# Patient Record
Sex: FEMALE | Race: WHITE | NOT HISPANIC OR LATINO
[De-identification: names, ages, dates, MRNs, and addresses within clinical notes are randomized per-mention and may not be internally consistent; named-entity substitution may affect disease eponyms.]

---

## 2018-04-24 ENCOUNTER — RESULT REVIEW (OUTPATIENT)
Age: 53
End: 2018-04-24

## 2019-02-21 PROBLEM — Z00.00 ENCOUNTER FOR PREVENTIVE HEALTH EXAMINATION: Status: ACTIVE | Noted: 2019-02-21

## 2019-02-25 ENCOUNTER — RECORD ABSTRACTING (OUTPATIENT)
Age: 54
End: 2019-02-25

## 2019-02-25 DIAGNOSIS — R23.2 FLUSHING: ICD-10-CM

## 2019-02-25 DIAGNOSIS — Z78.9 OTHER SPECIFIED HEALTH STATUS: ICD-10-CM

## 2019-02-25 DIAGNOSIS — Z87.59 PERSONAL HISTORY OF OTHER COMPLICATIONS OF PREGNANCY, CHILDBIRTH AND THE PUERPERIUM: ICD-10-CM

## 2019-02-25 DIAGNOSIS — N39.3 STRESS INCONTINENCE (FEMALE) (MALE): ICD-10-CM

## 2019-02-25 LAB — CYTOLOGY CVX/VAG DOC THIN PREP: NORMAL

## 2019-04-25 ENCOUNTER — APPOINTMENT (OUTPATIENT)
Dept: OBGYN | Facility: CLINIC | Age: 54
End: 2019-04-25
Payer: COMMERCIAL

## 2019-04-25 ENCOUNTER — RESULT CHARGE (OUTPATIENT)
Age: 54
End: 2019-04-25

## 2019-04-25 VITALS
HEIGHT: 64 IN | SYSTOLIC BLOOD PRESSURE: 110 MMHG | WEIGHT: 129 LBS | DIASTOLIC BLOOD PRESSURE: 80 MMHG | BODY MASS INDEX: 22.02 KG/M2

## 2019-04-25 DIAGNOSIS — Z12.4 ENCOUNTER FOR SCREENING FOR MALIGNANT NEOPLASM OF CERVIX: ICD-10-CM

## 2019-04-25 DIAGNOSIS — Z12.31 ENCOUNTER FOR SCREENING MAMMOGRAM FOR MALIGNANT NEOPLASM OF BREAST: ICD-10-CM

## 2019-04-25 DIAGNOSIS — R92.2 INCONCLUSIVE MAMMOGRAM: ICD-10-CM

## 2019-04-25 DIAGNOSIS — N89.8 OTHER SPECIFIED NONINFLAMMATORY DISORDERS OF VAGINA: ICD-10-CM

## 2019-04-25 LAB
BILIRUB UR QL STRIP: NORMAL
GLUCOSE UR-MCNC: NORMAL
HCG UR QL: 0.2 EU/DL
HGB UR QL STRIP.AUTO: NORMAL
KETONES UR-MCNC: NORMAL
LEUKOCYTE ESTERASE UR QL STRIP: NORMAL
NITRITE UR QL STRIP: NORMAL
PH UR STRIP: 6.5
PROT UR STRIP-MCNC: NORMAL
SP GR UR STRIP: 1

## 2019-04-25 PROCEDURE — 81003 URINALYSIS AUTO W/O SCOPE: CPT | Mod: QW

## 2019-04-25 PROCEDURE — 99396 PREV VISIT EST AGE 40-64: CPT

## 2019-04-25 RX ORDER — LEVOTHYROXINE SODIUM 75 UG/1
75 TABLET ORAL
Refills: 0 | Status: DISCONTINUED | COMMUNITY
End: 2019-04-25

## 2019-04-25 RX ORDER — LEVOTHYROXINE SODIUM 88 UG/1
88 TABLET ORAL
Qty: 90 | Refills: 0 | Status: DISCONTINUED | COMMUNITY
Start: 2019-02-21

## 2019-04-25 RX ORDER — MECLIZINE HYDROCHLORIDE 25 MG/1
25 TABLET ORAL
Qty: 30 | Refills: 0 | Status: DISCONTINUED | COMMUNITY
Start: 2018-11-28

## 2019-04-25 RX ORDER — METHYLPREDNISOLONE 4 MG/1
4 TABLET ORAL
Qty: 21 | Refills: 0 | Status: DISCONTINUED | COMMUNITY
Start: 2018-11-28

## 2019-04-25 NOTE — PHYSICAL EXAM
[Awake] : awake [Alert] : alert [Soft] : soft [Labia Majora] : labia major [Labia Minora] : labia minora [Uterine Adnexae] : were not tender and not enlarged [Normal] : clitoris [Acute Distress] : no acute distress [LAD] : no lymphadenopathy [Mass] : no breast mass [Thyroid Nodule] : no thyroid nodule [Goiter] : no goiter [Nipple Discharge] : no nipple discharge [Axillary LAD] : no axillary lymphadenopathy [Tender] : non tender

## 2019-04-25 NOTE — COUNSELING
[Breast Self Exam] : breast self exam [Exercise] : exercise [Other ___] : [unfilled] [Vitamins/Supplements] : vitamins/supplements

## 2019-04-25 NOTE — HISTORY OF PRESENT ILLNESS
[Definite:  ___ (Date)] : the last menstrual period was [unfilled] [Menarche Age: ____] : age at menarche was [unfilled] [Sexually Active] : is sexually active [Monogamous] : is monogamous [Male ___] : [unfilled] male [Contraception] : does not use contraception

## 2019-04-28 LAB — HPV HIGH+LOW RISK DNA PNL CVX: NOT DETECTED

## 2019-04-29 LAB — CYTOLOGY CVX/VAG DOC THIN PREP: NORMAL

## 2020-03-04 ENCOUNTER — APPOINTMENT (OUTPATIENT)
Dept: OBGYN | Facility: CLINIC | Age: 55
End: 2020-03-04
Payer: COMMERCIAL

## 2020-03-04 VITALS
SYSTOLIC BLOOD PRESSURE: 98 MMHG | BODY MASS INDEX: 21.33 KG/M2 | DIASTOLIC BLOOD PRESSURE: 62 MMHG | HEIGHT: 65 IN | WEIGHT: 128 LBS

## 2020-03-04 DIAGNOSIS — N63.20 UNSPECIFIED LUMP IN THE LEFT BREAST, UNSPECIFIED QUADRANT: ICD-10-CM

## 2020-03-04 PROCEDURE — 99213 OFFICE O/P EST LOW 20 MIN: CPT

## 2020-03-04 NOTE — HISTORY OF PRESENT ILLNESS
[Last Mammogram ___] : Last Mammogram was [unfilled] [Last Pap ___] : Last cervical pap smear was [unfilled] [Perimenopausal] : is perimenopausal [Monogamous (Male Partner)] : is monogamous with a male partner [HPV Vaccine NA Due to Age] : HPV vaccine not available to patient due to age [unknown] : the patient is unsure of the date of her LMP [Menarche Age: ____] : age at menarche was [unfilled] [Sexually Active] : is sexually active [Monogamous] : is monogamous [___ Month(s) Ago] : [unfilled] month(s) ago [Good] : being in good health [Healthy Diet] : a healthy diet [Regular Exercise] : regular exercise [Pregnancy History] : pregnancy history: [Total Preg ___] : [unfilled] [Full Term ___] : [unfilled] [Living ___] : [unfilled] [AB Spont ___] : miscarriages: [unfilled] [Weight Concerns] : no concerns with her weight

## 2020-04-27 ENCOUNTER — APPOINTMENT (OUTPATIENT)
Dept: OBGYN | Facility: CLINIC | Age: 55
End: 2020-04-27

## 2020-08-24 ENCOUNTER — APPOINTMENT (OUTPATIENT)
Dept: OBGYN | Facility: CLINIC | Age: 55
End: 2020-08-24
Payer: COMMERCIAL

## 2020-08-24 VITALS
TEMPERATURE: 97.9 F | DIASTOLIC BLOOD PRESSURE: 70 MMHG | HEIGHT: 65 IN | WEIGHT: 128 LBS | SYSTOLIC BLOOD PRESSURE: 110 MMHG | BODY MASS INDEX: 21.33 KG/M2

## 2020-08-24 DIAGNOSIS — Z01.419 ENCOUNTER FOR GYNECOLOGICAL EXAMINATION (GENERAL) (ROUTINE) W/OUT ABNORMAL FINDINGS: ICD-10-CM

## 2020-08-24 DIAGNOSIS — R59.9 ENLARGED LYMPH NODES, UNSPECIFIED: ICD-10-CM

## 2020-08-24 DIAGNOSIS — R92.8 OTHER ABNORMAL AND INCONCLUSIVE FINDINGS ON DIAGNOSTIC IMAGING OF BREAST: ICD-10-CM

## 2020-08-24 PROCEDURE — 99396 PREV VISIT EST AGE 40-64: CPT

## 2020-08-25 PROBLEM — R59.9 ENLARGED LYMPH NODE: Status: ACTIVE | Noted: 2020-08-25

## 2020-08-25 NOTE — REVIEW OF SYSTEMS
[Nl] : Integumentary [Dec Hearing] : no hearing loss [Chest Pain] : no chest pain [Chills] : no chills [Abdominal Pain] : no abdominal pain [Pelvic Pain] : no pelvic pain [Breast Pain] : no breast pain

## 2020-08-25 NOTE — PHYSICAL EXAM
[Awake] : awake [Breast Palpation Diffuse Fibrous Tissue Bilateral] : fibrocystic changes [Alert] : alert [Breast Implant Bilateral] : implants [No Discharge] : no discharge [No Masses] : no breast masses were palpable [Size ___ cm] : [unfilled]Ucm(s) in diameter [Axillary Lymph Nodes Enlarged On The Left] : enlarged node on the left [Mobile] : mobile [Rubbery] : rubbery [Labia Minora] : labia minora [Labia Majora] : labia major [No Bleeding] : there was no active vaginal bleeding [Normal] : clitoris [Acute Distress] : no acute distress [Superficial Breast Veins Dilated Bilaterally] : no dilated superficial veins [Dimpling In Both Breasts] : no dimpling [Breast - Peau D'Orange Bilateral] : no Peau D'Orange [___] : no induration [Skin Erythema] : no erythema [Enlargement Of The Right Breast] : no swelling [Discharge] : had no discharge [Tender] : non-tender [Pap Obtained] : a Pap smear was not performed [FreeTextEntry6] : small mobile uterus. normal adnexa. No masses.

## 2020-08-25 NOTE — HISTORY OF PRESENT ILLNESS
[Good] : being in good health [1 Year Ago] : 1 year ago [Healthy Diet] : a healthy diet [Last Mammogram ___] : Last Mammogram was [unfilled] [Postmenopausal] : is postmenopausal [Last Pap ___] : Last cervical pap smear was [unfilled] [Menarche Age ____] : age at menarche was [unfilled] [Menstrual Problems] : reports abnormal menses [Pregnancy History] : pregnancy history: [Total Preg ___] : [unfilled] [Living ___] : [unfilled] [Full Term ___] : [unfilled] [AB Spont ___] : miscarriages: [unfilled] [HPV Vaccine NA Due to Age] : HPV vaccine not available to patient due to age [unknown] : the patient is unsure of the date of her LMP [Menarche Age: ____] : age at menarche was [unfilled] [Sexually Active] : is sexually active [Monogamous] : is monogamous [No] : no [Male ___] : [unfilled] male [Weight Concerns] : no concerns with her weight [Regular Exercise] : not exercising regularly [Contraception] : does not use contraception

## 2020-09-08 ENCOUNTER — OUTPATIENT (OUTPATIENT)
Dept: OUTPATIENT SERVICES | Facility: HOSPITAL | Age: 55
LOS: 1 days | End: 2020-09-08
Payer: COMMERCIAL

## 2020-09-08 ENCOUNTER — APPOINTMENT (OUTPATIENT)
Dept: ULTRASOUND IMAGING | Facility: CLINIC | Age: 55
End: 2020-09-08
Payer: COMMERCIAL

## 2020-09-08 ENCOUNTER — APPOINTMENT (OUTPATIENT)
Dept: MAMMOGRAPHY | Facility: CLINIC | Age: 55
End: 2020-09-08
Payer: COMMERCIAL

## 2020-09-08 DIAGNOSIS — Z00.8 ENCOUNTER FOR OTHER GENERAL EXAMINATION: ICD-10-CM

## 2020-09-08 DIAGNOSIS — R59.9 ENLARGED LYMPH NODES, UNSPECIFIED: ICD-10-CM

## 2020-09-08 PROCEDURE — 76642 ULTRASOUND BREAST LIMITED: CPT | Mod: 26,LT

## 2020-09-08 PROCEDURE — 77065 DX MAMMO INCL CAD UNI: CPT

## 2020-09-08 PROCEDURE — G0279: CPT | Mod: 26

## 2020-09-08 PROCEDURE — 77065 DX MAMMO INCL CAD UNI: CPT | Mod: 26,LT

## 2020-09-08 PROCEDURE — G0279: CPT

## 2020-09-08 PROCEDURE — 76642 ULTRASOUND BREAST LIMITED: CPT

## 2020-09-15 ENCOUNTER — RESULT REVIEW (OUTPATIENT)
Age: 55
End: 2020-09-15

## 2020-09-15 ENCOUNTER — APPOINTMENT (OUTPATIENT)
Dept: ULTRASOUND IMAGING | Facility: CLINIC | Age: 55
End: 2020-09-15
Payer: COMMERCIAL

## 2020-09-15 ENCOUNTER — APPOINTMENT (OUTPATIENT)
Dept: MAMMOGRAPHY | Facility: CLINIC | Age: 55
End: 2020-09-15
Payer: COMMERCIAL

## 2020-09-15 ENCOUNTER — OUTPATIENT (OUTPATIENT)
Dept: OUTPATIENT SERVICES | Facility: HOSPITAL | Age: 55
LOS: 1 days | End: 2020-09-15
Payer: COMMERCIAL

## 2020-09-15 ENCOUNTER — TRANSCRIPTION ENCOUNTER (OUTPATIENT)
Age: 55
End: 2020-09-15

## 2020-09-15 DIAGNOSIS — R92.1 MAMMOGRAPHIC CALCIFICATION FOUND ON DIAGNOSTIC IMAGING OF BREAST: ICD-10-CM

## 2020-09-15 PROCEDURE — 19083 BX BREAST 1ST LESION US IMAG: CPT | Mod: LT

## 2020-09-15 PROCEDURE — 88341 IMHCHEM/IMCYTCHM EA ADD ANTB: CPT | Mod: 26,59

## 2020-09-15 PROCEDURE — 19083 BX BREAST 1ST LESION US IMAG: CPT

## 2020-09-15 PROCEDURE — 88360 TUMOR IMMUNOHISTOCHEM/MANUAL: CPT | Mod: 26

## 2020-09-15 PROCEDURE — 77065 DX MAMMO INCL CAD UNI: CPT | Mod: 26,LT

## 2020-09-15 PROCEDURE — 19081 BX BREAST 1ST LESION STRTCTC: CPT

## 2020-09-15 PROCEDURE — 88305 TISSUE EXAM BY PATHOLOGIST: CPT | Mod: 26

## 2020-09-15 PROCEDURE — 88305 TISSUE EXAM BY PATHOLOGIST: CPT

## 2020-09-15 PROCEDURE — 88342 IMHCHEM/IMCYTCHM 1ST ANTB: CPT | Mod: 26,59

## 2020-09-15 PROCEDURE — 19081 BX BREAST 1ST LESION STRTCTC: CPT | Mod: LT

## 2020-09-15 PROCEDURE — 88341 IMHCHEM/IMCYTCHM EA ADD ANTB: CPT

## 2020-09-15 PROCEDURE — 88360 TUMOR IMMUNOHISTOCHEM/MANUAL: CPT

## 2020-09-15 PROCEDURE — 77065 DX MAMMO INCL CAD UNI: CPT

## 2020-09-15 PROCEDURE — A4648: CPT

## 2020-09-30 ENCOUNTER — APPOINTMENT (OUTPATIENT)
Dept: MRI IMAGING | Facility: CLINIC | Age: 55
End: 2020-09-30

## 2020-12-22 ENCOUNTER — APPOINTMENT (OUTPATIENT)
Dept: SURGERY | Facility: CLINIC | Age: 55
End: 2020-12-22
Payer: COMMERCIAL

## 2020-12-22 VITALS
TEMPERATURE: 98.4 F | WEIGHT: 129 LBS | DIASTOLIC BLOOD PRESSURE: 76 MMHG | OXYGEN SATURATION: 99 % | SYSTOLIC BLOOD PRESSURE: 114 MMHG | HEIGHT: 64 IN | HEART RATE: 88 BPM | BODY MASS INDEX: 22.02 KG/M2

## 2020-12-22 DIAGNOSIS — F41.9 ANXIETY DISORDER, UNSPECIFIED: ICD-10-CM

## 2020-12-22 DIAGNOSIS — G47.9 SLEEP DISORDER, UNSPECIFIED: ICD-10-CM

## 2020-12-22 PROCEDURE — 99205 OFFICE O/P NEW HI 60 MIN: CPT

## 2020-12-22 PROCEDURE — 99072 ADDL SUPL MATRL&STAF TM PHE: CPT

## 2020-12-23 PROBLEM — Z01.419 ENCOUNTER FOR ROUTINE GYNECOLOGICAL EXAMINATION: Status: RESOLVED | Noted: 2020-08-24 | Resolved: 2020-12-23

## 2020-12-23 PROBLEM — G47.9 DIFFICULTY SLEEPING: Status: ACTIVE | Noted: 2020-12-23

## 2020-12-23 PROBLEM — F41.9 ANXIETY: Status: ACTIVE | Noted: 2020-12-23

## 2020-12-28 NOTE — ASSESSMENT
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  Recommendation:\par 1.  Consult with plastic surgeon\par 2.  Restaging MR breast ( scheduled at [facility] on 2/4/2021)\par 3.  Neoadjuvant chemotherapy ( anticipate to complete on 1/28/2021)\par 4.  AMOL to be placed in postive node\par 5.  Follow up on 2/9/2021 after restaging MR breast

## 2020-12-28 NOTE — HISTORY OF PRESENT ILLNESS
[FreeTextEntry1] : I had the pleasure of seeing Dora Valladares in the office for a new visit breast evaluation secondary to newly diagnosed locally advanced left breast cancer. \par \par Dora is accompanied by her  Garth. She was diagnosed with left breast cancer (DCIS of the left breast ER negative MD negative) with + lymph node metastasis (ER negative MD negative [id number] positive) and is currently receiving neoadjuvant treatment at [facility] [place]. She has 3 treatments left to complete neoadjuvant and is scheduled to undergo MRI/mammogram in early Feb 2021.\par \par She initially presented to her gynecologist with a complaint of left breast palpable finding in the subareolar position for which she underwent breast imaging which demonstrated a nodule and calcifications thought to be benign. She then saw Dr. Yang (Breast surgeon) who agreed that findings were benign. She was scheduled to undergo repeat imaging in 10/2020 for follow up but prior to this she palpated what she reports as a lump in the left lower lying axillary region.  At this time she underwent repeat imaging with diagnostic mammogram/ultrasound and a biopsy which demonstrated DCIS ER  negative MD negative and a left axillary node was biopsied ER negative MD negative Ulh9gel negative. \par \par 10/5/202 Genetics: [facility] Negative\par \par 9/15/2020  Pathology\par 1.Left breast inferior axillary lymph node, ultrasound guided core biopsy:  Metastatic mammary duct carcinoma measuring 0.8 cm.  ER 0% MD 0% Her2 Positive\par 2.  Left breast inner stereotactic core biopsy:  DCIS, solid and cribriform pattern with high grade nuclear atypia with comedonecrosis.  Microcalcifications is present in DCIS.  Uninvolved breast tissue shows proliferative fibrocystic disease.  ER 0% MD 0%\par \par We reviewed pathophysiology of the tumor at length.  She is already scheduled for restaging MR breast on 2/4/2021 at [facility] [place] and will follow up after MR breast.  Consultation will be arranged with plastic surgeon to discuss breast reconstruction options.  We also discussed having a AMOL being placed in the positive node.\par \par She understands and agrees with plan.  All questions answered.\par

## 2020-12-28 NOTE — PHYSICAL EXAM
[Normocephalic] : normocephalic [Atraumatic] : atraumatic [EOMI] : extra ocular movement intact [PERRL] : pupils equal, round and reactive to light [Sclera nonicteric] : sclera nonicteric [Supple] : supple [No Supraclavicular Adenopathy] : no supraclavicular adenopathy [Examined in the supine and seated position] : examined in the supine and seated position [No dominant masses] : no dominant masses in right breast  [No dominant masses] : no dominant masses left breast [No Nipple Retraction] : no left nipple retraction [No Nipple Discharge] : no left nipple discharge [No Axillary Lymphadenopathy] : no left axillary lymphadenopathy [No Edema] : no edema [No Rashes] : no rashes [No Ulceration] : no ulceration [de-identified] : No supraclavicular or axillary adenopathy. No dominant masses, normal to palpation. Everted nipple without discharge. No skin changes.\par \par  [de-identified] : No supraclavicular or axillary adenopathy. No dominant masses, normal to palpation. Everted nipple without discharge. No skin changes.\par \par

## 2021-01-07 ENCOUNTER — RESULT REVIEW (OUTPATIENT)
Age: 56
End: 2021-01-07

## 2021-01-07 ENCOUNTER — OUTPATIENT (OUTPATIENT)
Dept: OUTPATIENT SERVICES | Facility: HOSPITAL | Age: 56
LOS: 1 days | End: 2021-01-07
Payer: COMMERCIAL

## 2021-01-07 DIAGNOSIS — N64.89 OTHER SPECIFIED DISORDERS OF BREAST: ICD-10-CM

## 2021-01-07 PROCEDURE — 19285 PERQ DEV BREAST 1ST US IMAG: CPT

## 2021-01-07 PROCEDURE — 19285 PERQ DEV BREAST 1ST US IMAG: CPT | Mod: LT

## 2021-01-07 PROCEDURE — C1739: CPT

## 2021-01-11 ENCOUNTER — NON-APPOINTMENT (OUTPATIENT)
Age: 56
End: 2021-01-11

## 2021-01-14 ENCOUNTER — APPOINTMENT (OUTPATIENT)
Dept: PLASTIC SURGERY | Facility: CLINIC | Age: 56
End: 2021-01-14

## 2021-02-01 ENCOUNTER — OUTPATIENT (OUTPATIENT)
Dept: OUTPATIENT SERVICES | Facility: HOSPITAL | Age: 56
LOS: 1 days | End: 2021-02-01
Payer: COMMERCIAL

## 2021-02-01 ENCOUNTER — RESULT REVIEW (OUTPATIENT)
Age: 56
End: 2021-02-01

## 2021-02-01 DIAGNOSIS — C50.912 MALIGNANT NEOPLASM OF UNSPECIFIED SITE OF LEFT FEMALE BREAST: ICD-10-CM

## 2021-02-01 PROCEDURE — 88321 CONSLTJ&REPRT SLD PREP ELSWR: CPT

## 2021-02-02 DIAGNOSIS — C50.912 MALIGNANT NEOPLASM OF UNSPECIFIED SITE OF LEFT FEMALE BREAST: ICD-10-CM

## 2021-02-17 ENCOUNTER — APPOINTMENT (OUTPATIENT)
Dept: ULTRASOUND IMAGING | Facility: CLINIC | Age: 56
End: 2021-02-17
Payer: COMMERCIAL

## 2021-02-22 ENCOUNTER — RESULT REVIEW (OUTPATIENT)
Age: 56
End: 2021-02-22

## 2021-02-22 ENCOUNTER — OUTPATIENT (OUTPATIENT)
Dept: OUTPATIENT SERVICES | Facility: HOSPITAL | Age: 56
LOS: 1 days | End: 2021-02-22
Payer: COMMERCIAL

## 2021-02-22 VITALS
TEMPERATURE: 98 F | SYSTOLIC BLOOD PRESSURE: 108 MMHG | DIASTOLIC BLOOD PRESSURE: 76 MMHG | WEIGHT: 126.99 LBS | HEART RATE: 68 BPM | HEIGHT: 64 IN | OXYGEN SATURATION: 98 % | RESPIRATION RATE: 18 BRPM

## 2021-02-22 DIAGNOSIS — Z01.818 ENCOUNTER FOR OTHER PREPROCEDURAL EXAMINATION: ICD-10-CM

## 2021-02-22 DIAGNOSIS — C50.912 MALIGNANT NEOPLASM OF UNSPECIFIED SITE OF LEFT FEMALE BREAST: ICD-10-CM

## 2021-02-22 DIAGNOSIS — Z98.891 HISTORY OF UTERINE SCAR FROM PREVIOUS SURGERY: Chronic | ICD-10-CM

## 2021-02-22 DIAGNOSIS — Z98.82 BREAST IMPLANT STATUS: Chronic | ICD-10-CM

## 2021-02-22 DIAGNOSIS — Z98.890 OTHER SPECIFIED POSTPROCEDURAL STATES: Chronic | ICD-10-CM

## 2021-02-22 LAB
ANION GAP SERPL CALC-SCNC: 4 MMOL/L — LOW (ref 5–17)
APPEARANCE UR: CLEAR — SIGNIFICANT CHANGE UP
APTT BLD: 30.1 SEC — SIGNIFICANT CHANGE UP (ref 27.5–35.5)
BASOPHILS # BLD AUTO: 0.03 K/UL — SIGNIFICANT CHANGE UP (ref 0–0.2)
BASOPHILS NFR BLD AUTO: 0.7 % — SIGNIFICANT CHANGE UP (ref 0–2)
BILIRUB UR-MCNC: NEGATIVE — SIGNIFICANT CHANGE UP
BUN SERPL-MCNC: 13 MG/DL — SIGNIFICANT CHANGE UP (ref 7–23)
CALCIUM SERPL-MCNC: 8.9 MG/DL — SIGNIFICANT CHANGE UP (ref 8.5–10.1)
CHLORIDE SERPL-SCNC: 107 MMOL/L — SIGNIFICANT CHANGE UP (ref 96–108)
CO2 SERPL-SCNC: 29 MMOL/L — SIGNIFICANT CHANGE UP (ref 22–31)
COLOR SPEC: YELLOW — SIGNIFICANT CHANGE UP
CREAT SERPL-MCNC: 0.71 MG/DL — SIGNIFICANT CHANGE UP (ref 0.5–1.3)
DIFF PNL FLD: NEGATIVE — SIGNIFICANT CHANGE UP
EOSINOPHIL # BLD AUTO: 0.09 K/UL — SIGNIFICANT CHANGE UP (ref 0–0.5)
EOSINOPHIL NFR BLD AUTO: 2 % — SIGNIFICANT CHANGE UP (ref 0–6)
GLUCOSE SERPL-MCNC: 94 MG/DL — SIGNIFICANT CHANGE UP (ref 70–99)
GLUCOSE UR QL: NEGATIVE MG/DL — SIGNIFICANT CHANGE UP
HCT VFR BLD CALC: 34.5 % — SIGNIFICANT CHANGE UP (ref 34.5–45)
HGB BLD-MCNC: 11.1 G/DL — LOW (ref 11.5–15.5)
IMM GRANULOCYTES NFR BLD AUTO: 0.4 % — SIGNIFICANT CHANGE UP (ref 0–1.5)
INR BLD: 1.11 RATIO — SIGNIFICANT CHANGE UP (ref 0.88–1.16)
KETONES UR-MCNC: NEGATIVE — SIGNIFICANT CHANGE UP
LEUKOCYTE ESTERASE UR-ACNC: NEGATIVE — SIGNIFICANT CHANGE UP
LYMPHOCYTES # BLD AUTO: 0.53 K/UL — LOW (ref 1–3.3)
LYMPHOCYTES # BLD AUTO: 11.7 % — LOW (ref 13–44)
MCHC RBC-ENTMCNC: 32.2 GM/DL — SIGNIFICANT CHANGE UP (ref 32–36)
MCHC RBC-ENTMCNC: 33.3 PG — SIGNIFICANT CHANGE UP (ref 27–34)
MCV RBC AUTO: 103.6 FL — HIGH (ref 80–100)
MONOCYTES # BLD AUTO: 0.47 K/UL — SIGNIFICANT CHANGE UP (ref 0–0.9)
MONOCYTES NFR BLD AUTO: 10.4 % — SIGNIFICANT CHANGE UP (ref 2–14)
MRSA PCR RESULT.: SIGNIFICANT CHANGE UP
NEUTROPHILS # BLD AUTO: 3.39 K/UL — SIGNIFICANT CHANGE UP (ref 1.8–7.4)
NEUTROPHILS NFR BLD AUTO: 74.8 % — SIGNIFICANT CHANGE UP (ref 43–77)
NITRITE UR-MCNC: NEGATIVE — SIGNIFICANT CHANGE UP
PH UR: 6.5 — SIGNIFICANT CHANGE UP (ref 5–8)
PLATELET # BLD AUTO: 242 K/UL — SIGNIFICANT CHANGE UP (ref 150–400)
POTASSIUM SERPL-MCNC: 3.9 MMOL/L — SIGNIFICANT CHANGE UP (ref 3.5–5.3)
POTASSIUM SERPL-SCNC: 3.9 MMOL/L — SIGNIFICANT CHANGE UP (ref 3.5–5.3)
PROT UR-MCNC: NEGATIVE MG/DL — SIGNIFICANT CHANGE UP
PROTHROM AB SERPL-ACNC: 12.8 SEC — SIGNIFICANT CHANGE UP (ref 10.6–13.6)
RBC # BLD: 3.33 M/UL — LOW (ref 3.8–5.2)
RBC # FLD: 13.9 % — SIGNIFICANT CHANGE UP (ref 10.3–14.5)
S AUREUS DNA NOSE QL NAA+PROBE: SIGNIFICANT CHANGE UP
SODIUM SERPL-SCNC: 140 MMOL/L — SIGNIFICANT CHANGE UP (ref 135–145)
SP GR SPEC: 1.01 — SIGNIFICANT CHANGE UP (ref 1.01–1.02)
UROBILINOGEN FLD QL: NEGATIVE MG/DL — SIGNIFICANT CHANGE UP
WBC # BLD: 4.53 K/UL — SIGNIFICANT CHANGE UP (ref 3.8–10.5)
WBC # FLD AUTO: 4.53 K/UL — SIGNIFICANT CHANGE UP (ref 3.8–10.5)

## 2021-02-22 PROCEDURE — 71046 X-RAY EXAM CHEST 2 VIEWS: CPT

## 2021-02-22 PROCEDURE — 85025 COMPLETE CBC W/AUTO DIFF WBC: CPT

## 2021-02-22 PROCEDURE — 80048 BASIC METABOLIC PNL TOTAL CA: CPT

## 2021-02-22 PROCEDURE — 86900 BLOOD TYPING SEROLOGIC ABO: CPT

## 2021-02-22 PROCEDURE — 36415 COLL VENOUS BLD VENIPUNCTURE: CPT

## 2021-02-22 PROCEDURE — 93010 ELECTROCARDIOGRAM REPORT: CPT

## 2021-02-22 PROCEDURE — G0463: CPT | Mod: 25

## 2021-02-22 PROCEDURE — 86901 BLOOD TYPING SEROLOGIC RH(D): CPT

## 2021-02-22 PROCEDURE — 81003 URINALYSIS AUTO W/O SCOPE: CPT

## 2021-02-22 PROCEDURE — 71046 X-RAY EXAM CHEST 2 VIEWS: CPT | Mod: 26

## 2021-02-22 PROCEDURE — 86850 RBC ANTIBODY SCREEN: CPT

## 2021-02-22 PROCEDURE — 85730 THROMBOPLASTIN TIME PARTIAL: CPT

## 2021-02-22 PROCEDURE — 85610 PROTHROMBIN TIME: CPT

## 2021-02-22 PROCEDURE — 87641 MR-STAPH DNA AMP PROBE: CPT

## 2021-02-22 PROCEDURE — 93005 ELECTROCARDIOGRAM TRACING: CPT

## 2021-02-22 PROCEDURE — 87640 STAPH A DNA AMP PROBE: CPT

## 2021-02-22 NOTE — H&P PST ADULT - NSICDXPASTSURGICALHX_GEN_ALL_CORE_FT
PAST SURGICAL HISTORY:  H/O breast augmentation     H/O  section     H/O knee surgery for torn meniscus

## 2021-02-22 NOTE — H&P PST ADULT - HISTORY OF PRESENT ILLNESS
55 year old woman with history of hypothyroid recently diagnosed with breast cancer in September undergoing neoadjuvant treatment presents to [facility] for preprocedure exam. Patient is for planned bilateral mastectomy with left sentinel node biopsy possible axillary lymph node biopsy with bilateral breast reconstruction - removal silicone implants, with possible direct implant placement possible tissue expanders with possible alloderm with Dr Perrin and Dr Shankar respectively on March 1. Patient with right chest wall mediport. Patient offers no complaints.

## 2021-02-22 NOTE — H&P PST ADULT - ATTENDING COMMENTS
Patient with left breast cancer now s/p neoadjuvant chemotherapy undergoing bilateral skin sparing mastectomy with left sentinel lymph node mapping, and biopsy possible axillary dissection. She understands risk of lymphedema and risks specific to procedure as well as alternative. All questions were answered.

## 2021-02-22 NOTE — H&P PST ADULT - ASSESSMENT
55 year old woman with history of hypothyroid recently diagnosed with breast cancer in September undergoing neoadjuvant treatment presents to PST for preprocedure exam. Patient is for planned bilateral mastectomy with left sentinel node biopsy possible axillary lymph node biopsy with bilateral breast reconstruction - removal silicone implants, with possible direct implant placement possible tissue expanders with possible alloderm with Dr Perrin and Dr Shankar respectively on March 1.         Plan:  - PST instructions given ; NPO status instructions to be given by ASU .  - Pt instructed to take following meds with sip of water : synthroid, coreg  - Pt instructed to take routine evening medications unless indicated .  -  Stop NSAIDS ( Aspirin Alev Motrin Mobic Diclofenac), herbal supplements , MVI , Vitamin fish oil 7 days prior to surgery  unless   directed by surgeon or cardiologist;   - Medical Optimization  with Dr Sierra  - EZ wash instructions given & mupirocin instructions given.  - Labs EKG CXR as per surgeon request.   -  Pt instructed to self quarantine .  - Covid Testing scheduled  Pt notified and aware.  - Pt denies covid symptoms shortness of breath fever cough .

## 2021-02-23 DIAGNOSIS — C50.912 MALIGNANT NEOPLASM OF UNSPECIFIED SITE OF LEFT FEMALE BREAST: ICD-10-CM

## 2021-02-23 DIAGNOSIS — Z01.818 ENCOUNTER FOR OTHER PREPROCEDURAL EXAMINATION: ICD-10-CM

## 2021-02-25 ENCOUNTER — APPOINTMENT (OUTPATIENT)
Dept: SURGERY | Facility: CLINIC | Age: 56
End: 2021-02-25
Payer: COMMERCIAL

## 2021-02-25 VITALS
SYSTOLIC BLOOD PRESSURE: 109 MMHG | HEART RATE: 80 BPM | WEIGHT: 125.04 LBS | HEIGHT: 64 IN | DIASTOLIC BLOOD PRESSURE: 67 MMHG | BODY MASS INDEX: 21.35 KG/M2 | OXYGEN SATURATION: 100 % | TEMPERATURE: 97.7 F

## 2021-02-25 PROCEDURE — 99213 OFFICE O/P EST LOW 20 MIN: CPT

## 2021-02-25 PROCEDURE — 99072 ADDL SUPL MATRL&STAF TM PHE: CPT

## 2021-02-26 ENCOUNTER — APPOINTMENT (OUTPATIENT)
Dept: DISASTER EMERGENCY | Facility: CLINIC | Age: 56
End: 2021-02-26

## 2021-02-26 PROBLEM — C50.919 MALIGNANT NEOPLASM OF UNSPECIFIED SITE OF UNSPECIFIED FEMALE BREAST: Chronic | Status: ACTIVE | Noted: 2021-02-22

## 2021-02-26 PROBLEM — E03.9 HYPOTHYROIDISM, UNSPECIFIED: Chronic | Status: ACTIVE | Noted: 2021-02-22

## 2021-02-27 LAB — SARS-COV-2 N GENE NPH QL NAA+PROBE: NOT DETECTED

## 2021-03-01 ENCOUNTER — APPOINTMENT (OUTPATIENT)
Dept: SURGERY | Facility: HOSPITAL | Age: 56
End: 2021-03-01
Payer: COMMERCIAL

## 2021-03-01 ENCOUNTER — RESULT REVIEW (OUTPATIENT)
Age: 56
End: 2021-03-01

## 2021-03-01 ENCOUNTER — OUTPATIENT (OUTPATIENT)
Dept: INPATIENT UNIT | Facility: HOSPITAL | Age: 56
LOS: 1 days | Discharge: HOME | End: 2021-03-01
Payer: COMMERCIAL

## 2021-03-01 VITALS
RESPIRATION RATE: 14 BRPM | SYSTOLIC BLOOD PRESSURE: 107 MMHG | HEART RATE: 74 BPM | DIASTOLIC BLOOD PRESSURE: 66 MMHG | TEMPERATURE: 98 F | OXYGEN SATURATION: 100 % | WEIGHT: 125 LBS | HEIGHT: 65 IN

## 2021-03-01 DIAGNOSIS — E03.9 HYPOTHYROIDISM, UNSPECIFIED: ICD-10-CM

## 2021-03-01 DIAGNOSIS — D24.2 BENIGN NEOPLASM OF LEFT BREAST: ICD-10-CM

## 2021-03-01 DIAGNOSIS — C50.919 MALIGNANT NEOPLASM OF UNSPECIFIED SITE OF UNSPECIFIED FEMALE BREAST: ICD-10-CM

## 2021-03-01 DIAGNOSIS — D24.1 BENIGN NEOPLASM OF RIGHT BREAST: ICD-10-CM

## 2021-03-01 DIAGNOSIS — Z85.3 PERSONAL HISTORY OF MALIGNANT NEOPLASM OF BREAST: ICD-10-CM

## 2021-03-01 DIAGNOSIS — Z98.891 HISTORY OF UTERINE SCAR FROM PREVIOUS SURGERY: Chronic | ICD-10-CM

## 2021-03-01 DIAGNOSIS — C50.912 MALIGNANT NEOPLASM OF UNSPECIFIED SITE OF LEFT FEMALE BREAST: ICD-10-CM

## 2021-03-01 DIAGNOSIS — Z98.82 BREAST IMPLANT STATUS: Chronic | ICD-10-CM

## 2021-03-01 DIAGNOSIS — Z98.890 OTHER SPECIFIED POSTPROCEDURAL STATES: Chronic | ICD-10-CM

## 2021-03-01 PROCEDURE — 88305 TISSUE EXAM BY PATHOLOGIST: CPT

## 2021-03-01 PROCEDURE — C1889: CPT

## 2021-03-01 PROCEDURE — 88331 PATH CONSLTJ SURG 1 BLK 1SPC: CPT | Mod: 26

## 2021-03-01 PROCEDURE — 88333 PATH CONSLTJ SURG CYTO XM 1: CPT

## 2021-03-01 PROCEDURE — 88331 PATH CONSLTJ SURG 1 BLK 1SPC: CPT

## 2021-03-01 PROCEDURE — A9520: CPT

## 2021-03-01 PROCEDURE — C9290: CPT

## 2021-03-01 PROCEDURE — 38792 RA TRACER ID OF SENTINL NODE: CPT | Mod: LT,59

## 2021-03-01 PROCEDURE — 76098 X-RAY EXAM SURGICAL SPECIMEN: CPT | Mod: 26

## 2021-03-01 PROCEDURE — C9399: CPT

## 2021-03-01 PROCEDURE — 88307 TISSUE EXAM BY PATHOLOGIST: CPT | Mod: 26,59

## 2021-03-01 PROCEDURE — 88300 SURGICAL PATH GROSS: CPT | Mod: 26,59

## 2021-03-01 PROCEDURE — 88300 SURGICAL PATH GROSS: CPT

## 2021-03-01 PROCEDURE — 88334 PATH CONSLTJ SURG CYTO XM EA: CPT | Mod: 26,59

## 2021-03-01 PROCEDURE — 99261: CPT

## 2021-03-01 PROCEDURE — 76098 X-RAY EXAM SURGICAL SPECIMEN: CPT

## 2021-03-01 PROCEDURE — 38525 BIOPSY/REMOVAL LYMPH NODES: CPT | Mod: LT

## 2021-03-01 PROCEDURE — C1789: CPT

## 2021-03-01 PROCEDURE — 38900 IO MAP OF SENT LYMPH NODE: CPT | Mod: LT

## 2021-03-01 PROCEDURE — 88307 TISSUE EXAM BY PATHOLOGIST: CPT

## 2021-03-01 PROCEDURE — 88304 TISSUE EXAM BY PATHOLOGIST: CPT

## 2021-03-01 PROCEDURE — 19303 MAST SIMPLE COMPLETE: CPT | Mod: 50

## 2021-03-01 PROCEDURE — 88304 TISSUE EXAM BY PATHOLOGIST: CPT | Mod: 26,59

## 2021-03-01 PROCEDURE — 88305 TISSUE EXAM BY PATHOLOGIST: CPT | Mod: 26,59

## 2021-03-01 PROCEDURE — 19303 MAST SIMPLE COMPLETE: CPT | Mod: AS,50

## 2021-03-01 RX ORDER — CEFAZOLIN SODIUM 1 G
1000 VIAL (EA) INJECTION EVERY 8 HOURS
Refills: 0 | Status: DISCONTINUED | OUTPATIENT
Start: 2021-03-01 | End: 2021-03-02

## 2021-03-01 RX ORDER — ONDANSETRON 8 MG/1
4 TABLET, FILM COATED ORAL EVERY 6 HOURS
Refills: 0 | Status: DISCONTINUED | OUTPATIENT
Start: 2021-03-01 | End: 2021-03-02

## 2021-03-01 RX ORDER — MORPHINE SULFATE 50 MG/1
2 CAPSULE, EXTENDED RELEASE ORAL EVERY 4 HOURS
Refills: 0 | Status: DISCONTINUED | OUTPATIENT
Start: 2021-03-01 | End: 2021-03-02

## 2021-03-01 RX ORDER — OXYCODONE AND ACETAMINOPHEN 5; 325 MG/1; MG/1
2 TABLET ORAL EVERY 6 HOURS
Refills: 0 | Status: DISCONTINUED | OUTPATIENT
Start: 2021-03-01 | End: 2021-03-02

## 2021-03-01 RX ORDER — SODIUM CHLORIDE 9 MG/ML
1000 INJECTION, SOLUTION INTRAVENOUS
Refills: 0 | Status: DISCONTINUED | OUTPATIENT
Start: 2021-03-01 | End: 2021-03-02

## 2021-03-01 RX ORDER — OXYCODONE AND ACETAMINOPHEN 5; 325 MG/1; MG/1
1 TABLET ORAL EVERY 4 HOURS
Refills: 0 | Status: DISCONTINUED | OUTPATIENT
Start: 2021-03-01 | End: 2021-03-02

## 2021-03-01 RX ORDER — HEPARIN SODIUM 5000 [USP'U]/ML
5000 INJECTION INTRAVENOUS; SUBCUTANEOUS EVERY 8 HOURS
Refills: 0 | Status: DISCONTINUED | OUTPATIENT
Start: 2021-03-01 | End: 2021-03-02

## 2021-03-01 RX ORDER — CEFAZOLIN SODIUM 1 G
1000 VIAL (EA) INJECTION EVERY 8 HOURS
Refills: 0 | Status: DISCONTINUED | OUTPATIENT
Start: 2021-03-01 | End: 2021-03-01

## 2021-03-01 RX ADMIN — OXYCODONE AND ACETAMINOPHEN 1 TABLET(S): 5; 325 TABLET ORAL at 16:09

## 2021-03-01 RX ADMIN — Medication 1000 MILLIGRAM(S): at 21:53

## 2021-03-01 RX ADMIN — HEPARIN SODIUM 5000 UNIT(S): 5000 INJECTION INTRAVENOUS; SUBCUTANEOUS at 21:53

## 2021-03-01 RX ADMIN — OXYCODONE AND ACETAMINOPHEN 1 TABLET(S): 5; 325 TABLET ORAL at 22:13

## 2021-03-01 RX ADMIN — SODIUM CHLORIDE 125 MILLILITER(S): 9 INJECTION, SOLUTION INTRAVENOUS at 16:09

## 2021-03-01 NOTE — ASU PREOP CHECKLIST - BOWEL PREP
3/11/2024    Se Yoshi Bergman        [address] [facility] [address]            Dear Se Yoshi Bergman,      Our records indicate that you are due for an appointment for a Colonoscopy with Fabio Gomez MD. Our doctors are booking out about 3-6 months in advance for procedures.     Please call our office to schedule a phone screening appointment to plan for the procedure(s).   Your medical well-being is important to us.    If your insurance requires a referral, please call your primary care office to request one.      Thank you,      The Physicians and Staff at [facility]         n/a

## 2021-03-01 NOTE — H&P ADULT - NSHPPHYSICALEXAM_GEN_ALL_CORE
Neuro: A+O x4 ARREDONDO  RESP: CTA A/P  CV: RRR S1 S2  Breast: intact breast implants  ABD: Soft non tender +BS

## 2021-03-01 NOTE — BRIEF OPERATIVE NOTE - NSICDXBRIEFPROCEDURE_GEN_ALL_CORE_FT
PROCEDURES:  Bilateral simple mastectomy 01-Mar-2021 12:14:41  Stephanie Sharp  
PROCEDURES:  Reconstruction of both breasts with tissue expander 01-Mar-2021 13:32:32 alloderm bilateral Bailey Tam

## 2021-03-01 NOTE — ASU DISCHARGE PLAN (ADULT/PEDIATRIC) - ASU DC SPECIAL INSTRUCTIONSFT
You should follow-up in the office on Monday 3/8/21, please call for follow-up appointment if you have not already made one. [phone]. Please start your antibiotics today and continue until completed. Please keep an accurate record of drain output and bring it to your follow up appointment.   Prescription pain medications have been prescribed for you, take as needed for pain only. Do not drive a vehicle or operate machinery while taking narcotic pain medication.   Ambulating is very important post operatively, make sure you ambulate several times daily.   If you experience bleeding that does not stop, swelling, hardness of surgical site, chest pain, shortness of breath, leg pain, dizziness or any signs or symptoms of infection such as fever, redness, pus, foul smell of surgical site please contact the office immediately, [phone].

## 2021-03-01 NOTE — H&P ADULT - PROBLEM SELECTOR PLAN 1
surgery today   admit for 23 hour stay   OOB to chair  pain management   advance diet as tolertated   DC to home after being seen by attending

## 2021-03-02 ENCOUNTER — TRANSCRIPTION ENCOUNTER (OUTPATIENT)
Age: 56
End: 2021-03-02

## 2021-03-02 VITALS
OXYGEN SATURATION: 100 % | RESPIRATION RATE: 17 BRPM | SYSTOLIC BLOOD PRESSURE: 97 MMHG | TEMPERATURE: 98 F | HEART RATE: 89 BPM | DIASTOLIC BLOOD PRESSURE: 52 MMHG

## 2021-03-02 RX ADMIN — OXYCODONE AND ACETAMINOPHEN 1 TABLET(S): 5; 325 TABLET ORAL at 11:29

## 2021-03-02 RX ADMIN — Medication 1000 MILLIGRAM(S): at 05:45

## 2021-03-02 RX ADMIN — HEPARIN SODIUM 5000 UNIT(S): 5000 INJECTION INTRAVENOUS; SUBCUTANEOUS at 05:45

## 2021-03-02 RX ADMIN — OXYCODONE AND ACETAMINOPHEN 1 TABLET(S): 5; 325 TABLET ORAL at 05:56

## 2021-03-02 NOTE — DISCHARGE NOTE NURSING/CASE MANAGEMENT/SOCIAL WORK - PATIENT PORTAL LINK FT
You can access the FollowMyHealth Patient Portal offered by [facility] by registering at the following website: [email or web address]. By joining [email or web address]’s FollowMyHealth portal, you will also be able to view your health information using other applications (apps) compatible with our system.

## 2021-03-02 NOTE — PROGRESS NOTE ADULT - ASSESSMENT
POD#1 s/p bilateral mastectomy and reconstruction with [place] and alloderm, doing well.  Okay for discharge home today.  She will f/u with me on Monday.

## 2021-03-02 NOTE — PROGRESS NOTE ADULT - SUBJECTIVE AND OBJECTIVE BOX
Feels well. Pain well controlled    AVSS  voiding  drains 4-110cc ss  Breasts soft; pink viable mastectomy skin flaps  Incisions intact with steristrips

## 2021-03-16 ENCOUNTER — APPOINTMENT (OUTPATIENT)
Dept: SURGERY | Facility: CLINIC | Age: 56
End: 2021-03-16
Payer: COMMERCIAL

## 2021-03-16 VITALS
BODY MASS INDEX: 21.51 KG/M2 | SYSTOLIC BLOOD PRESSURE: 115 MMHG | OXYGEN SATURATION: 100 % | HEIGHT: 64 IN | DIASTOLIC BLOOD PRESSURE: 74 MMHG | TEMPERATURE: 98 F | HEART RATE: 78 BPM | WEIGHT: 126 LBS

## 2021-03-16 PROCEDURE — 99024 POSTOP FOLLOW-UP VISIT: CPT

## 2021-04-08 NOTE — ASSESSMENT
[FreeTextEntry1] : 56 yo was diagnosed with left breast cancer (DCIS of the left breast ER negative RI negative) with + lymph node metastasis (ER negative RI negative [id number] positive) and is currently completed neoadjuvant treatment at [facility] [place].  Restaging MR breast demonstrated a complete response to her neoadjuvant chemotherapy. Plan for bilateral mastectomy with left SLNB possible ALND.  She understands the left nipple will need to be removed.  Recommendation:\par 1. Bilateral mastectomy with left SLND possible ALND\par 2. Medical clearance\par 3. Follow up with Dr Patton at [facility]\par 4.  Cardiac clearance Dr. Whit Freeman

## 2021-04-08 NOTE — PHYSICAL EXAM
[Normocephalic] : normocephalic [Atraumatic] : atraumatic [EOMI] : extra ocular movement intact [PERRL] : pupils equal, round and reactive to light [Sclera nonicteric] : sclera nonicteric [Supple] : supple [No Supraclavicular Adenopathy] : no supraclavicular adenopathy [Examined in the supine and seated position] : examined in the supine and seated position [No dominant masses] : no dominant masses in right breast  [No dominant masses] : no dominant masses left breast [No Nipple Retraction] : no left nipple retraction [No Nipple Discharge] : no left nipple discharge [No Axillary Lymphadenopathy] : no left axillary lymphadenopathy [No Edema] : no edema [No Rashes] : no rashes [No Ulceration] : no ulceration [de-identified] : No supraclavicular or axillary adenopathy. No dominant masses, normal to palpation. Everted nipple without discharge. No skin changes.\par \par  [de-identified] : No supraclavicular or axillary adenopathy. No dominant masses, normal to palpation. Everted nipple without discharge. No skin changes.\par \par

## 2021-04-08 NOTE — HISTORY OF PRESENT ILLNESS
[FreeTextEntry1] : I had the pleasure of seeing Dora Valladares in the office to review restaging MR breast and upcoming surgery secondary to diagnosed locally advanced left breast cancer. \par \par Dora is accompanied by her  Garth. She was diagnosed with left breast cancer (DCIS of the left breast ER negative CT negative) with + lymph node metastasis (ER negative CT negative [id number] positive) and is currently receiving neoadjuvant treatment at [facility]. She has 3 treatments left to complete neoadjuvant and is scheduled to undergo MRI/mammogram in early Feb 2021.\par \par She initially presented to her gynecologist with a complaint of left breast palpable finding in the subareolar position for which she underwent breast imaging which demonstrated a nodule and calcifications thought to be benign. She then saw Dr. Yang (Breast surgeon) who agreed that findings were benign. She was scheduled to undergo repeat imaging in 10/2020 for follow up but prior to this she palpated what she reports as a lump in the left lower lying axillary region.  At this time she underwent repeat imaging with diagnostic mammogram/ultrasound and a biopsy which demonstrated DCIS ER  negative CT negative and a left axillary node was biopsied ER negative CT negative Gvx0xcx negative. \par \par 10/5/202 Genetics: Invitae Negative\par \par 9/15/2020  Pathology\par 1.Left breast inferior axillary lymph node, ultrasound guided core biopsy:  Metastatic mammary duct carcinoma measuring 0.8 cm.  ER 0% CT 0% Her2 Positive\par 2.  Left breast inner stereotactic core biopsy:  DCIS, solid and cribriform pattern with high grade nuclear atypia with comedonecrosis.  Microcalcifications is present in DCIS.  Uninvolved breast tissue shows proliferative fibrocystic disease.  ER 0% CT 0%\par \par 2/15/2021  Bilateral breast MRI\par Impression:  No suspicious enhancement in the right breast.  Resolution of abnormal enhancement in the left breast consistent with treatment response.  BIRADS 6 \par \par We reviewed restaging MR breast and upcoming surgery.  She understands she had a complete response to her neoadjuvant chemotherapy. Plan for bilateral mastectomy with left SLNB possible ALND.  She understands the left nipple will need to be removed.  \par \par She understands and agrees with plan.  All questions answered.\par

## 2021-04-15 ENCOUNTER — NON-APPOINTMENT (OUTPATIENT)
Age: 56
End: 2021-04-15

## 2021-04-16 ENCOUNTER — APPOINTMENT (OUTPATIENT)
Dept: RADIATION ONCOLOGY | Facility: CLINIC | Age: 56
End: 2021-04-16
Payer: COMMERCIAL

## 2021-04-16 VITALS
WEIGHT: 125 LBS | BODY MASS INDEX: 21.34 KG/M2 | HEIGHT: 64 IN | RESPIRATION RATE: 16 BRPM | SYSTOLIC BLOOD PRESSURE: 90 MMHG | DIASTOLIC BLOOD PRESSURE: 50 MMHG | HEART RATE: 74 BPM

## 2021-04-16 DIAGNOSIS — I42.7 CARDIOMYOPATHY DUE TO DRUG AND EXTERNAL AGENT: ICD-10-CM

## 2021-04-16 DIAGNOSIS — Z80.3 FAMILY HISTORY OF MALIGNANT NEOPLASM OF BREAST: ICD-10-CM

## 2021-04-16 PROCEDURE — 99072 ADDL SUPL MATRL&STAF TM PHE: CPT

## 2021-04-16 PROCEDURE — 99205 OFFICE O/P NEW HI 60 MIN: CPT | Mod: 25

## 2021-04-16 RX ORDER — ZOLPIDEM TARTRATE 5 MG/1
5 TABLET ORAL
Qty: 30 | Refills: 0 | Status: DISCONTINUED | COMMUNITY
Start: 2020-12-23 | End: 2021-04-16

## 2021-04-16 RX ORDER — ALPRAZOLAM 0.25 MG/1
0.25 TABLET ORAL
Qty: 30 | Refills: 0 | Status: DISCONTINUED | COMMUNITY
Start: 2020-12-23 | End: 2021-04-16

## 2021-04-16 NOTE — HISTORY OF PRESENT ILLNESS
[FreeTextEntry1] : The patient is a pleasant 55 year old female diagnosed with left breast cancer. She had cosmetic implants and initially presented to her gynecologist with a complaint of left breast palpable finding in the subareolar position for which she underwent breast imaging 4/8/2020 which demonstrated a nodule and calcifications thought to be benign. She then saw Dr. Jones (Breast surgeon) who agreed that findings were benign. She was scheduled to undergo repeat imaging in 10/2020 for follow up but prior to this she palpated a firm pea sized lump in the left lower axillary region.  She underwent repeat imaging with diagnostic mammogram/ultrasound and a biopsy of left breast on 9/15/2020 demonstrated DCIS solid and comedo types with high nuclear grade, ER negative NM negative. Lleft breast inferior axillary node measured 1.6 cm and biopsy was positive for metastatic carcinoma ER negative NM negative, [id number] 3+positive.  10/5/2020 [facility] Genetics was negative. She started  neoadjuvant treatment at [facility] with AC x 4 and received Taxol/herceptin x 1 but her EF was found to decline to 42% and she therefore completed Taxol alone and has not received any additional Herceptin. Breast MRI on 2/15/2021 showed no suspicious enhancement in the bilateral breasts with resolution of abnormal enhancement in the left breast consistent with complete treatment response. She had bilateral mastectomy with left SLNB and expander placement 3/01/2021 with Williams Perrin and Vania. Final pathology showed left breast with no residual carcinoma and three negative sentinel nodes. 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 She is scheduled for a PET/CT next week and presents to discuss the role of radiation therapy in her care.\par

## 2021-04-16 NOTE — PHYSICAL EXAM
[Breast Palpation Mass] : no palpable masses [No UE Edema] : there is no upper extremity edema [Normal] : oriented to person, place and time, the affect was normal, the mood was normal and not anxious [de-identified] : right CW port [de-identified] : b/l expanders taute

## 2021-04-16 NOTE — VITALS
[Date: ____________] : Patient's last distress assessment performed on [unfilled]. [6 - Distress Level] : Distress Level: 6 [Maximal Pain Intensity: 0/10] : 0/10 [90: Able to carry normal activity; minor signs or symptoms of disease.] : 90: Able to carry normal activity; minor signs or symptoms of disease.

## 2021-04-21 PROCEDURE — 77332 RADIATION TREATMENT AID(S): CPT

## 2021-04-21 PROCEDURE — 77263 THER RADIOLOGY TX PLNG CPLX: CPT

## 2021-04-21 PROCEDURE — 99072 ADDL SUPL MATRL&STAF TM PHE: CPT

## 2021-04-21 PROCEDURE — 77290 THER RAD SIMULAJ FIELD CPLX: CPT

## 2021-04-23 PROCEDURE — 77295 3-D RADIOTHERAPY PLAN: CPT

## 2021-04-23 PROCEDURE — 77300 RADIATION THERAPY DOSE PLAN: CPT

## 2021-04-23 PROCEDURE — 77334 RADIATION TREATMENT AID(S): CPT

## 2021-04-30 PROCEDURE — 99072 ADDL SUPL MATRL&STAF TM PHE: CPT

## 2021-04-30 PROCEDURE — 77280 THER RAD SIMULAJ FIELD SMPL: CPT

## 2021-05-03 PROCEDURE — 77427 RADIATION TX MANAGEMENT X5: CPT

## 2021-05-03 NOTE — HISTORY OF PRESENT ILLNESS
[FreeTextEntry1] : I had the pleasure of seeing Dora Valladares in the office for post operative visit s/p bilateral mastectomy with left SLNB performed on 3/1/2021.\par \par Dora is accompanied by her  Garth. She was diagnosed with left breast cancer (DCIS of the left breast ER negative KS negative) with + lymph node metastasis (ER negative KS negative Ofj8zbe positive) and is currently receiving neoadjuvant treatment at [facility]. She has 3 treatments left to complete neoadjuvant and is scheduled to undergo MRI/mammogram in early Feb 2021.\par \par She initially presented to her gynecologist with a complaint of left breast palpable finding in the subareolar position for which she underwent breast imaging which demonstrated a nodule and calcifications thought to be benign. She then saw Dr. Yang (Breast surgeon) who agreed that findings were benign. She was scheduled to undergo repeat imaging in 10/2020 for follow up but prior to this she palpated what she reports as a lump in the left lower lying axillary region.  At this time she underwent repeat imaging with diagnostic mammogram/ultrasound and a biopsy which demonstrated DCIS ER  negative KS negative and a left axillary node was biopsied ER negative KS negative [id number] negative.   She started neoadjuvant chemotherapy treatment at [facility] with ACx4 and received Taxol/Herceptin x1 but her EF was found to decline 42% and she therefore completed Taxol alone and has not received any additional Herceptin.  Breast MR breast on 2/15/2021 showed no suspicious enhancement in the bilateral breast with resolution of abnormal enhancement in left breast with no residual carcinoma. \par \par She underwent bilateral mastectomy with left SLNB.  Final pathology demonstrated no residual carcinoma. \par \par 10/5/202 Genetics: Invitae Negative\par \par 9/15/2020  Pathology\par 1.Left breast inferior axillary lymph node, ultrasound guided core biopsy:  Metastatic mammary duct carcinoma measuring 0.8 cm.  ER 0% KS 0% Her2 Positive\par 2.  Left breast inner stereotactic core biopsy:  DCIS, solid and cribriform pattern with high grade nuclear atypia with comedonecrosis.  Microcalcifications is present in DCIS.  Uninvolved breast tissue shows proliferative fibrocystic disease.  ER 0% KS 0%\par \par 2/15/2021  Bilateral breast MRI\par Impression:  No suspicious enhancement in the right breast.  Resolution of abnormal enhancement in the left breast consistent with treatment response.  BIRADS 6 \par \par 3/1/2021  Surgical pathology\par 1.  Please see gross description.\par 2.  Breast (right, simple mastectomy):  Fibroadenoma (4mm).  Microcalcification present.  Sclerosing adenosis, duct  ectasia, microcysts, fibroadenmatoid change and stromal fibrosis.  Skin and nipple with no significant histologic alteration.  \par 3.  Tissue ( Right breast, additional tissue, lateral flap: Fibroadipose tissue.\par 4.  Lymph node ( left sentinel node #1):  One lymph node negative carcinoma (0/1):  One lymph node negative for carcinoma (0/1).  Focal fibrosis, few macrophages and associated adipose tissue.\par 5.  Lymph nodes (left sentinel node # 2):  Two lymph node negative for carcinoma (0/2).  One lymph nodes negative for carcinoma (0/2).  One lymph nodes with focal fibrosis, few macrophages, associated adipose tissue and striated muscle.\par 6.  Tissue ( right breast capsule):  Fibrotic capsule with few macrophages.\par 7.  Breast (left, simple mastectomy):  No residual carcinoma.  Negative nipple, skin, and posterior margin.  Biopsy site changes with fibrosis and scar.  Fibroadenoma (3mm).  Microcalcifications present.  Adenosis, duct ectasia microcysts fibroadenomatoid change and nodular stromal fibrosis. \par 8.  skin (right breast):  No significant histologic alterations.\par 9.  Please see gross description.\par 10.  Tissue ( left breast capsule):  Fibrotic capsule with few macrophages and associated striated muscle.  \par 11.  Skin (left breast):  no significant histologic alteration.\par \par We reviewed final pathology.  Final pathology demonstrated no residual carcinoma.  She is healing well.  Recommendation for consult with radiation oncology, medical oncology.\par \par She understands and agrees with plan.  All questions answered.\par

## 2021-05-03 NOTE — PHYSICAL EXAM
[Normocephalic] : normocephalic [Atraumatic] : atraumatic [EOMI] : extra ocular movement intact [PERRL] : pupils equal, round and reactive to light [Sclera nonicteric] : sclera nonicteric [Supple] : supple [No Supraclavicular Adenopathy] : no supraclavicular adenopathy [Examined in the supine and seated position] : examined in the supine and seated position [No Axillary Lymphadenopathy] : no left axillary lymphadenopathy [No Edema] : no edema [No Rashes] : no rashes [No Ulceration] : no ulceration [de-identified] : flap health and well integrated  no evidence of hematoma seroma or infection\par \par \par  [de-identified] : flap health and well integrated  no evidence of hematoma seroma or infection\par \par \par

## 2021-05-03 NOTE — ASSESSMENT
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  Final pathology demonstrated no residual carcinoma.  She is healing well.  Recommendation for consult with radiation oncology, medical oncology.\par 1.  Radiation oncology consult\par 2.  Medical oncology consult\par 3.  Follow up 8 weeks\par

## 2021-05-04 ENCOUNTER — NON-APPOINTMENT (OUTPATIENT)
Age: 56
End: 2021-05-04

## 2021-05-04 VITALS — HEIGHT: 64 IN | RESPIRATION RATE: 16 BRPM | BODY MASS INDEX: 20.66 KG/M2 | HEART RATE: 74 BPM | WEIGHT: 121 LBS

## 2021-05-04 PROCEDURE — 99072 ADDL SUPL MATRL&STAF TM PHE: CPT

## 2021-05-04 PROCEDURE — G6012: CPT

## 2021-05-04 PROCEDURE — G6017: CPT

## 2021-05-04 NOTE — DISEASE MANAGEMENT
[Pathological] : TNM Stage: p [IA] : IA [NTNM] : 1 [TTNM] : is [MTNM] : 0 [de-identified] : 400 [de-identified] : 0963 [de-identified] : Left chest wall and sclav

## 2021-05-04 NOTE — REVIEW OF SYSTEMS
[Alopecia: Grade 0] : Alopecia: Grade 0 [Pruritus: Grade 0] : Pruritus: Grade 0 [Skin Atrophy: Grade 0] : Skin Atrophy: Grade 0 [Skin Induration: Grade 0] : Skin Induration: Grade 0 [Skin Hyperpigmentation: Grade 0] : Skin Hyperpigmentation: Grade 0 [Dermatitis Radiation: Grade 0] : Dermatitis Radiation: Grade 0

## 2021-05-04 NOTE — PHYSICAL EXAM
[Normal] : well developed, well nourished, in no acute distress [de-identified] : G0 erythene of skin

## 2021-05-04 NOTE — HISTORY OF PRESENT ILLNESS
[FreeTextEntry1] : The patient is a pleasant 55 year old female diagnosed with left breast cancer. She had cosmetic implants and initially presented to her gynecologist with a complaint of left breast palpable finding in the subareolar position for which she underwent breast imaging 4/8/2020 which demonstrated a nodule and calcifications thought to be benign. She then saw Dr. Jones (Breast surgeon) who agreed that findings were benign. She was scheduled to undergo repeat imaging in 10/2020 for follow up but prior to this she palpated a firm pea sized lump in the left lower axillary region.  She underwent repeat imaging with diagnostic mammogram/ultrasound and a biopsy of left breast on 9/15/2020 demonstrated DCIS solid and comedo types with high nuclear grade, ER negative CA negative. Lleft breast inferior axillary node measured 1.6 cm and biopsy was positive for metastatic carcinoma ER negative CA negative, [id number] 3+positive.  10/5/2020 [facility] was negative. She started  neoadjuvant treatment at [facility] with AC x 4 and received Taxol/herceptin x 1 but her EF was found to decline to 42% and she therefore completed Taxol alone and has not received any additional Herceptin. Breast MRI on 2/15/2021 showed no suspicious enhancement in the bilateral breasts with resolution of abnormal enhancement in the left breast consistent with complete treatment response. She had bilateral mastectomy with left SLNB and expander placement 3/01/2021 with Williams Perrin and Vania. Final pathology showed left breast with no residual carcinoma and three negative sentinel nodes. 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 A PET/CT was negative and began radiotherapy to the left chest wall and regional nodes.\par \par She presents for an OTV 2/25 fx. No complaints, moisturizing with Aloe and stretching.

## 2021-05-05 PROCEDURE — G6017: CPT

## 2021-05-05 PROCEDURE — G6012: CPT

## 2021-05-06 PROCEDURE — 77336 RADIATION PHYSICS CONSULT: CPT

## 2021-05-06 PROCEDURE — G6012: CPT

## 2021-05-06 PROCEDURE — 99072 ADDL SUPL MATRL&STAF TM PHE: CPT

## 2021-05-06 PROCEDURE — G6017: CPT

## 2021-05-07 PROCEDURE — G6012: CPT

## 2021-05-07 PROCEDURE — 77417 THER RADIOLOGY PORT IMAGE(S): CPT

## 2021-05-07 PROCEDURE — G6017: CPT

## 2021-05-07 PROCEDURE — 99072 ADDL SUPL MATRL&STAF TM PHE: CPT

## 2021-05-10 PROCEDURE — G6017: CPT

## 2021-05-10 PROCEDURE — 99072 ADDL SUPL MATRL&STAF TM PHE: CPT

## 2021-05-10 PROCEDURE — G6012: CPT

## 2021-05-10 PROCEDURE — 77427 RADIATION TX MANAGEMENT X5: CPT

## 2021-05-11 ENCOUNTER — APPOINTMENT (OUTPATIENT)
Dept: ENDOCRINOLOGY | Facility: CLINIC | Age: 56
End: 2021-05-11
Payer: COMMERCIAL

## 2021-05-11 ENCOUNTER — NON-APPOINTMENT (OUTPATIENT)
Age: 56
End: 2021-05-11

## 2021-05-11 VITALS — WEIGHT: 121 LBS | RESPIRATION RATE: 18 BRPM | HEIGHT: 64 IN | HEART RATE: 74 BPM | BODY MASS INDEX: 20.66 KG/M2

## 2021-05-11 VITALS
RESPIRATION RATE: 14 BRPM | DIASTOLIC BLOOD PRESSURE: 50 MMHG | WEIGHT: 125.25 LBS | SYSTOLIC BLOOD PRESSURE: 88 MMHG | HEART RATE: 108 BPM | HEIGHT: 64 IN | BODY MASS INDEX: 21.38 KG/M2 | OXYGEN SATURATION: 98 % | TEMPERATURE: 98.9 F

## 2021-05-11 DIAGNOSIS — Z83.49 FAMILY HISTORY OF OTHER ENDOCRINE, NUTRITIONAL AND METABOLIC DISEASES: ICD-10-CM

## 2021-05-11 DIAGNOSIS — Z78.9 OTHER SPECIFIED HEALTH STATUS: ICD-10-CM

## 2021-05-11 PROCEDURE — 99204 OFFICE O/P NEW MOD 45 MIN: CPT

## 2021-05-11 PROCEDURE — 99072 ADDL SUPL MATRL&STAF TM PHE: CPT

## 2021-05-11 PROCEDURE — G6017: CPT

## 2021-05-11 PROCEDURE — G6012: CPT

## 2021-05-11 RX ORDER — ATORVASTATIN CALCIUM 20 MG/1
20 TABLET, FILM COATED ORAL DAILY
Refills: 0 | Status: ACTIVE | COMMUNITY

## 2021-05-11 RX ORDER — SACUBITRIL AND VALSARTAN 49; 51 MG/1; MG/1
49-51 TABLET, FILM COATED ORAL
Refills: 0 | Status: DISCONTINUED | COMMUNITY
End: 2021-05-11

## 2021-05-11 RX ORDER — LEVOTHYROXINE SODIUM 88 UG/1
88 TABLET ORAL
Refills: 0 | Status: DISCONTINUED | COMMUNITY
End: 2021-05-11

## 2021-05-11 RX ORDER — CARVEDILOL 25 MG/1
25 TABLET, FILM COATED ORAL TWICE DAILY
Refills: 0 | Status: ACTIVE | COMMUNITY

## 2021-05-11 RX ORDER — CARVEDILOL 25 MG/1
TABLET, FILM COATED ORAL
Refills: 0 | Status: DISCONTINUED | COMMUNITY
End: 2021-05-11

## 2021-05-11 NOTE — HISTORY OF PRESENT ILLNESS
[FreeTextEntry1] : The patient is a pleasant 55 year old female diagnosed with left breast cancer. She had cosmetic implants and initially presented to her gynecologist with a complaint of left breast palpable finding in the subareolar position for which she underwent breast imaging 4/8/2020 which demonstrated a nodule and calcifications thought to be benign. She then saw Dr. Jones (Breast surgeon) who agreed that findings were benign. She was scheduled to undergo repeat imaging in 10/2020 for follow up but prior to this she palpated a firm pea sized lump in the left lower axillary region.  She underwent repeat imaging with diagnostic mammogram/ultrasound and a biopsy of left breast on 9/15/2020 demonstrated DCIS solid and comedo types with high nuclear grade, ER negative WI negative. Lleft breast inferior axillary node measured 1.6 cm and biopsy was positive for metastatic carcinoma ER negative [place] negative, [id number] 3+positive.  10/5/2020 [facility] was negative. She started  neoadjuvant treatment at [facility] with AC x 4 and received Taxol/herceptin x 1 but her EF was found to decline to 42% and she therefore completed Taxol alone and has not received any additional Herceptin. Breast MRI on 2/15/2021 showed no suspicious enhancement in the bilateral breasts with resolution of abnormal enhancement in the left breast consistent with complete treatment response. She had bilateral mastectomy with left SLNB and expander placement 3/01/2021 with Williams Perrin and Vania. Final pathology showed left breast with no residual carcinoma and three negative sentinel nodes. 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 A PET/CT was negative and began radiotherapy to the left chest wall and regional nodes.\par \par She presents for an OTV 7/25 fx. No complaints, moisturizing with Aloe and stretching. Denies pain or fatigue. She reports her echo last week was stable.

## 2021-05-11 NOTE — PHYSICAL EXAM
[Normal] : well developed, well nourished, in no acute distress [de-identified] : G0 erythene of skin left breast and regional nodes

## 2021-05-11 NOTE — PHYSICAL EXAM
[Alert] : alert [Well Nourished] : well nourished [No Acute Distress] : no acute distress [Well Developed] : well developed [Normal Sclera/Conjunctiva] : normal sclera/conjunctiva [EOMI] : extra ocular movement intact [No Proptosis] : no proptosis [Normal Oropharynx] : the oropharynx was normal [Thyroid Not Enlarged] : the thyroid was not enlarged [No Thyroid Nodules] : no palpable thyroid nodules [No Respiratory Distress] : no respiratory distress [No Accessory Muscle Use] : no accessory muscle use [Clear to Auscultation] : lungs were clear to auscultation bilaterally [Normal S1, S2] : normal S1 and S2 [Normal Rate] : heart rate was normal [Regular Rhythm] : with a regular rhythm [No Edema] : no peripheral edema [Pedal Pulses Normal] : the pedal pulses are present [Normal Bowel Sounds] : normal bowel sounds [Not Tender] : non-tender [Not Distended] : not distended [Soft] : abdomen soft [Normal Anterior Cervical Nodes] : no anterior cervical lymphadenopathy [Normal Posterior Cervical Nodes] : no posterior cervical lymphadenopathy [Spine Straight] : spine straight [No Stigmata of Cushings Syndrome] : no stigmata of Cushings Syndrome [Normal Gait] : normal gait [No Rash] : no rash [No Tremors] : no tremors [Oriented x3] : oriented to person, place, and time [Acanthosis Nigricans] : no acanthosis nigricans

## 2021-05-11 NOTE — DISEASE MANAGEMENT
[Pathological] : TNM Stage: p [IA] : IA [TTNM] : is [NTNM] : 1 [MTNM] : 0 [de-identified] : 9803 [de-identified] : 9687 [de-identified] : Left chest wall and sclav

## 2021-05-11 NOTE — HISTORY OF PRESENT ILLNESS
[FreeTextEntry1] : quality: dm2 \par onset 2016 \par severity: moderate \par modifying factors: Lt4  helped \par associated factors: breast cancer

## 2021-05-11 NOTE — ASSESSMENT
[Levothyroxine] : The patient was instructed to take Levothyroxine on an empty stomach, separate from vitamins, and wait at least 30 minutes before eating [FreeTextEntry1] : HypoT /Hashimoto's in patient with recent diagnosis of breast Ca, h/o HLD and NTMNG\par She had chemoT recently with CHF secondary to chemoT and low EF. Once EF improves they will consider immunotherapy.  \par \par HypoT:\par pt euthyroid clinically and biochemically.\par continue Lt 75 mcg qd \par Take daily in AM on an empty stomach at least 30 minutes before eating or taking other medication.\par \par NTMNG \par no dysphagia, no dysphonia, no neck pain, no voice change\par XRT in chest for recent breast cancer, no family h/o thyroid cancer \par check thyroid US at [facility]. \par last one probably more than 1 yr ago \par will continue to monitor nodules due to undergoing XRT \par \par HLD: LDL at target.\par low fat/low cholesterol diet and weight loss advised\par continue statin \par \par \par

## 2021-05-12 PROCEDURE — 99072 ADDL SUPL MATRL&STAF TM PHE: CPT

## 2021-05-12 PROCEDURE — G6012: CPT

## 2021-05-12 PROCEDURE — G6017: CPT

## 2021-05-13 ENCOUNTER — APPOINTMENT (OUTPATIENT)
Dept: SURGERY | Facility: CLINIC | Age: 56
End: 2021-05-13
Payer: COMMERCIAL

## 2021-05-13 VITALS
OXYGEN SATURATION: 98 % | HEART RATE: 94 BPM | TEMPERATURE: 98.1 F | DIASTOLIC BLOOD PRESSURE: 60 MMHG | WEIGHT: 123.05 LBS | SYSTOLIC BLOOD PRESSURE: 94 MMHG | BODY MASS INDEX: 21.01 KG/M2 | HEIGHT: 64 IN

## 2021-05-13 DIAGNOSIS — R92.1 MAMMOGRAPHIC CALCIFICATION FOUND ON DIAGNOSTIC IMAGING OF BREAST: ICD-10-CM

## 2021-05-13 PROCEDURE — 99024 POSTOP FOLLOW-UP VISIT: CPT

## 2021-05-13 PROCEDURE — G6012: CPT

## 2021-05-13 PROCEDURE — 77336 RADIATION PHYSICS CONSULT: CPT

## 2021-05-13 PROCEDURE — 99072 ADDL SUPL MATRL&STAF TM PHE: CPT

## 2021-05-13 PROCEDURE — G6017: CPT

## 2021-05-14 PROCEDURE — 77417 THER RADIOLOGY PORT IMAGE(S): CPT

## 2021-05-14 PROCEDURE — G6017: CPT

## 2021-05-14 PROCEDURE — G6012: CPT

## 2021-05-17 PROCEDURE — G6017: CPT

## 2021-05-17 PROCEDURE — G6012: CPT

## 2021-05-17 PROCEDURE — 77427 RADIATION TX MANAGEMENT X5: CPT

## 2021-05-17 PROCEDURE — 99072 ADDL SUPL MATRL&STAF TM PHE: CPT

## 2021-05-18 ENCOUNTER — NON-APPOINTMENT (OUTPATIENT)
Age: 56
End: 2021-05-18

## 2021-05-18 VITALS — RESPIRATION RATE: 18 BRPM | HEIGHT: 64 IN | WEIGHT: 122 LBS | BODY MASS INDEX: 20.83 KG/M2 | HEART RATE: 80 BPM

## 2021-05-18 PROCEDURE — G6012: CPT

## 2021-05-18 PROCEDURE — 99072 ADDL SUPL MATRL&STAF TM PHE: CPT

## 2021-05-18 PROCEDURE — G6017: CPT

## 2021-05-18 NOTE — DISEASE MANAGEMENT
[Pathological] : TNM Stage: p [IA] : IA [TTNM] : is [NTNM] : 1 [MTNM] : 0 [de-identified] : 0050 [de-identified] : 6911 [de-identified] : Left chest wall and sclav

## 2021-05-18 NOTE — PHYSICAL EXAM
[Normal] : well developed, well nourished, in no acute distress [de-identified] : G0 erythene of skin left breast and regional nodes

## 2021-05-19 PROCEDURE — G6012: CPT

## 2021-05-19 PROCEDURE — G6017: CPT

## 2021-05-20 PROCEDURE — G6017: CPT

## 2021-05-20 PROCEDURE — G6012: CPT

## 2021-05-20 PROCEDURE — 77336 RADIATION PHYSICS CONSULT: CPT

## 2021-05-21 PROCEDURE — 77417 THER RADIOLOGY PORT IMAGE(S): CPT

## 2021-05-21 PROCEDURE — G6012: CPT

## 2021-05-24 PROCEDURE — G6017: CPT

## 2021-05-24 PROCEDURE — 77427 RADIATION TX MANAGEMENT X5: CPT

## 2021-05-24 PROCEDURE — G6012: CPT

## 2021-05-25 ENCOUNTER — NON-APPOINTMENT (OUTPATIENT)
Age: 56
End: 2021-05-25

## 2021-05-25 PROCEDURE — G6017: CPT

## 2021-05-25 PROCEDURE — G6012: CPT

## 2021-05-25 NOTE — HISTORY OF PRESENT ILLNESS
[FreeTextEntry1] : The patient is a pleasant 55 year old female diagnosed with left breast cancer. She had cosmetic implants and initially presented to her gynecologist with a complaint of left breast palpable finding in the subareolar position for which she underwent breast imaging 4/8/2020 which demonstrated a nodule and calcifications thought to be benign. She then saw Dr. Jones (Breast surgeon) who agreed that findings were benign. She was scheduled to undergo repeat imaging in 10/2020 for follow up but prior to this she palpated a firm pea sized lump in the left lower axillary region.  She underwent repeat imaging with diagnostic mammogram/ultrasound and a biopsy of left breast on 9/15/2020 demonstrated DCIS solid and comedo types with high nuclear grade, ER negative [place] negative. Lleft breast inferior axillary node measured 1.6 cm and biopsy was positive for metastatic carcinoma ER negative [place] negative, [id number] 3+positive.  10/5/2020 [facility] was negative. She started  neoadjuvant treatment at [facility] with AC x 4 and received Taxol/herceptin x 1 but her EF was found to decline to 42% and she therefore completed Taxol alone and has not received any additional Herceptin. Breast MRI on 2/15/2021 showed no suspicious enhancement in the bilateral breasts with resolution of abnormal enhancement in the left breast consistent with complete treatment response. She had bilateral mastectomy with left SLNB and expander placement 3/01/2021 with Williams Perrin and Vania. Final pathology showed left breast with no residual carcinoma and three negative sentinel nodes. 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 A PET/CT was negative and began radiotherapy to the left chest wall and regional nodes.\par \par She presents for an OTV 17/25 fx. No complaints, moisturizing with Aloe and stretching. Denies pain or fatigue. Feeling well overall.

## 2021-05-25 NOTE — DISEASE MANAGEMENT
[Pathological] : TNM Stage: p [IA] : IA [TTNM] : is [NTNM] : 1 [MTNM] : 0 [de-identified] : 9938 [de-identified] : 1417 [de-identified] : Left chest wall and sclav

## 2021-05-25 NOTE — PHYSICAL EXAM
[Normal] : well developed, well nourished, in no acute distress [de-identified] : G1 erythene of skin left breast and axilla/sclav/upper back

## 2021-05-26 PROCEDURE — G6012: CPT

## 2021-05-26 PROCEDURE — G6017: CPT

## 2021-05-27 PROCEDURE — G6012: CPT

## 2021-05-27 PROCEDURE — G6017: CPT

## 2021-05-28 PROCEDURE — G6017: CPT

## 2021-05-28 PROCEDURE — G6012: CPT

## 2021-05-28 PROCEDURE — 77417 THER RADIOLOGY PORT IMAGE(S): CPT

## 2021-06-01 ENCOUNTER — NON-APPOINTMENT (OUTPATIENT)
Age: 56
End: 2021-06-01

## 2021-06-01 VITALS — RESPIRATION RATE: 16 BRPM | BODY MASS INDEX: 20.83 KG/M2 | HEART RATE: 74 BPM | HEIGHT: 64 IN | WEIGHT: 122 LBS

## 2021-06-01 PROCEDURE — G6017: CPT

## 2021-06-01 PROCEDURE — G6012: CPT

## 2021-06-01 PROCEDURE — 77427 RADIATION TX MANAGEMENT X5: CPT

## 2021-06-01 PROCEDURE — 77336 RADIATION PHYSICS CONSULT: CPT

## 2021-06-01 NOTE — DISEASE MANAGEMENT
[Pathological] : TNM Stage: p [IA] : IA [TTNM] : is [NTNM] : 1 [MTNM] : 0 [de-identified] : 1159 [de-identified] : 8905 [de-identified] : Left chest wall and sclav

## 2021-06-01 NOTE — PHYSICAL EXAM
[Normal] : well developed, well nourished, in no acute distress [de-identified] : G1 erythene of skin left breast and axilla/sclav/upper back. mild folliculitis UIQ L breast

## 2021-06-01 NOTE — HISTORY OF PRESENT ILLNESS
[FreeTextEntry1] : The patient is a pleasant 55 year old female diagnosed with left breast cancer. She had cosmetic implants and initially presented to her gynecologist with a complaint of left breast palpable finding in the subareolar position for which she underwent breast imaging 4/8/2020 which demonstrated a nodule and calcifications thought to be benign. She then saw Dr. Jones (Breast surgeon) who agreed that findings were benign. She was scheduled to undergo repeat imaging in 10/2020 for follow up but prior to this she palpated a firm pea sized lump in the left lower axillary region.  She underwent repeat imaging with diagnostic mammogram/ultrasound and a biopsy of left breast on 9/15/2020 demonstrated DCIS solid and comedo types with high nuclear grade, ER negative SD negative. Lleft breast inferior axillary node measured 1.6 cm and biopsy was positive for metastatic carcinoma ER negative SD negative, [id number] 3+positive.  10/5/2020 [facility] was negative. She started  [place] [facility] [place] 4 and received Taxol/herceptin x 1 but her EF was found to decline to 42% and she therefore completed Taxol alone and has not received any additional Herceptin. Breast MRI on 2/15/2021 showed no suspicious enhancement in the bilateral breasts with resolution of abnormal enhancement in the left breast consistent with complete treatment response. She had bilateral mastectomy with left SLNB and expander placement 3/01/2021 with Williams Perrin and Vania. Final pathology showed left breast with no residual carcinoma and three negative sentinel nodes. 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 A PET/CT was negative and began radiotherapy to the left chest wall and regional nodes.\par \par She presents for an OTV 21/25 fx. No complaints, moisturizing with Aloe and stretching. Denies pain or fatigue. Feeling well overall. Skin irritation started to show with slight red rash.Slight pruritis UIQ

## 2021-06-02 PROCEDURE — G6012: CPT

## 2021-06-02 PROCEDURE — G6017: CPT

## 2021-06-03 PROCEDURE — G6017: CPT

## 2021-06-03 PROCEDURE — G6012: CPT

## 2021-06-04 PROCEDURE — G6012: CPT

## 2021-06-04 PROCEDURE — G6017: CPT

## 2021-06-07 PROCEDURE — G6017: CPT

## 2021-06-07 PROCEDURE — 77336 RADIATION PHYSICS CONSULT: CPT

## 2021-06-07 PROCEDURE — G6012: CPT

## 2021-07-01 ENCOUNTER — NON-APPOINTMENT (OUTPATIENT)
Age: 56
End: 2021-07-01

## 2021-07-06 ENCOUNTER — APPOINTMENT (OUTPATIENT)
Dept: SURGERY | Facility: CLINIC | Age: 56
End: 2021-07-06
Payer: COMMERCIAL

## 2021-07-06 ENCOUNTER — RESULT REVIEW (OUTPATIENT)
Age: 56
End: 2021-07-06

## 2021-07-06 VITALS
HEIGHT: 64 IN | OXYGEN SATURATION: 98 % | BODY MASS INDEX: 20.66 KG/M2 | TEMPERATURE: 97.8 F | WEIGHT: 121 LBS | DIASTOLIC BLOOD PRESSURE: 59 MMHG | HEART RATE: 73 BPM | SYSTOLIC BLOOD PRESSURE: 97 MMHG

## 2021-07-06 DIAGNOSIS — N60.19 DIFFUSE CYSTIC MASTOPATHY OF UNSPECIFIED BREAST: ICD-10-CM

## 2021-07-06 PROCEDURE — 99214 OFFICE O/P EST MOD 30 MIN: CPT

## 2021-07-06 NOTE — PHYSICAL EXAM
[Normocephalic] : normocephalic [Atraumatic] : atraumatic [EOMI] : extra ocular movement intact [PERRL] : pupils equal, round and reactive to light [Sclera nonicteric] : sclera nonicteric [Supple] : supple [No Supraclavicular Adenopathy] : no supraclavicular adenopathy [Examined in the supine and seated position] : examined in the supine and seated position [No Axillary Lymphadenopathy] : no left axillary lymphadenopathy [No Edema] : no edema [No Rashes] : no rashes [No Ulceration] : no ulceration [de-identified] : flap health and well integrated  no evidence of hematoma seroma or infection\par \par \par  [de-identified] : flap health and well integrated  no evidence of hematoma seroma or infection\par \par \par

## 2021-07-06 NOTE — ASSESSMENT
[FreeTextEntry1] : 54 yo was diagnosed with left breast cancer (DCIS of the left breast ER negative [place] negative) with + lymph node metastasis (ER negative [place] negative [id number] positive) and is s/p bilateral mastectomy with left SLNB possible ALND.  Final pathology demonstrated no residual carcinoma.  She completed PMRT on 6/7/2021 and will be undergoing TE exchange on 9/17/2021.  She will be having a Echocardiogram and will discuss with Dr. Patton if she will receive Herceptin.  Recommendation for:\par 1.  Follow up with Dr. Patton\par 2.  Follow up with Dr. Wells\par 3.  Follow up in 4 months\par 4.  Left axilla biopsy-- pulling pain\par 5.  Request PET CT\par 6.  Request echocardiogram\par \par

## 2021-07-06 NOTE — HISTORY OF PRESENT ILLNESS
[FreeTextEntry1] : I had the pleasure of seeing Dora Valladares in the office for a follow up visit secondary to her diagnosed and treated left breast cancer.\par \par Dora is accompanied by her  Garth. She was diagnosed with left breast cancer (DCIS of the left breast ER negative TX negative) with + lymph node metastasis (ER negative TX negative [id number] positive) and is currently receiving neoadjuvant treatment at [facility]. She has 3 treatments left to complete neoadjuvant and is scheduled to undergo MRI/mammogram in early Feb 2021.\par \par She initially presented to her gynecologist with a complaint of left breast palpable finding in the subareolar position for which she underwent breast imaging which demonstrated a nodule and calcifications thought to be benign. She then saw Dr. Yang (Breast surgeon) who agreed that findings were benign. She was scheduled to undergo repeat imaging in 10/2020 for follow up but prior to this she palpated what she reports as a lump in the left lower lying axillary region.  At this time she underwent repeat imaging with diagnostic mammogram/ultrasound and a biopsy which demonstrated DCIS ER  negative TX negative and a left axillary node was biopsied ER negative TX negative [id number] negative.   She started neoadjuvant chemotherapy treatment at [facility] with ACx4 and received Taxol/Herceptin x1 but her EF was found to decline 42% and she therefore completed Taxol alone and has not received any additional Herceptin.  Breast MR breast on 2/15/2021 showed no suspicious enhancement in the bilateral breast with resolution of abnormal enhancement in left breast with no residual carcinoma. \par \par She underwent bilateral mastectomy with left SLNB.  Final pathology demonstrated no residual carcinoma. 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  ER 0% TX 0% Her2 Positive\par 2.  Left breast inner stereotactic core biopsy:  DCIS, solid and cribriform pattern with high grade nuclear atypia with comedonecrosis.  Microcalcifications is present in DCIS.  Uninvolved breast tissue shows proliferative fibrocystic disease.  ER 0% TX 0%\par \par 2/15/2021  Bilateral breast MRI\par Impression:  No suspicious enhancement in the right breast.  Resolution of abnormal enhancement in the left breast consistent with treatment response.  BIRADS 6 \par \par 3/1/2021  Surgical pathology\par 1.  Please see gross description.\par 2.  Breast (right, simple mastectomy):  Fibroadenoma (4mm).  Microcalcification present.  Sclerosing adenosis, duct  ectasia, microcysts, fibroadenmatoid change and stromal fibrosis.  Skin and nipple with no significant histologic alteration.  \par 3.  Tissue ( Right breast, additional tissue, lateral flap: Fibroadipose tissue.\par 4.  Lymph node ( left sentinel node #1):  One lymph node negative carcinoma (0/1):  One lymph node negative for carcinoma (0/1).  Focal fibrosis, few macrophages and associated adipose tissue.\par 5.  Lymph nodes (left sentinel node # 2):  Two lymph node negative for carcinoma (0/2).  One lymph nodes negative for carcinoma (0/2).  One lymph nodes with focal fibrosis, few macrophages, associated adipose tissue and striated muscle.\par 6.  Tissue ( right breast capsule):  Fibrotic capsule with few macrophages.\par 7.  Breast (left, simple mastectomy):  No residual carcinoma.  Negative nipple, skin, and posterior margin.  Biopsy site changes with fibrosis and scar.  Fibroadenoma (3mm).  Microcalcifications present.  Adenosis, duct ectasia microcysts fibroadenomatoid change and nodular stromal fibrosis. \par 8.  skin (right breast):  No significant histologic alterations.\par 9.  Please see gross description.\par 10.  Tissue ( left breast capsule):  Fibrotic capsule with few macrophages and associated striated muscle.  \par 11.  Skin (left breast):  no significant histologic alteration.\par \par We reviewed clinical exam and clinical exam is benign today.  She completed PMRT on 6/7/202.  She reports feeling pulling and lump in her left axilla area.  She reports PET CT she had 2 months ago was benign and will request results from Dr. Patton.  Recommendation for left axilla US and if benign then follow up in November.  She will be undergoing TE exchange on 9/17/2021. \par \par She understands and agrees with plan.  All questions answered.\par

## 2021-07-08 ENCOUNTER — APPOINTMENT (OUTPATIENT)
Dept: RADIATION ONCOLOGY | Facility: CLINIC | Age: 56
End: 2021-07-08
Payer: COMMERCIAL

## 2021-07-08 VITALS — RESPIRATION RATE: 16 BRPM | BODY MASS INDEX: 20.66 KG/M2 | WEIGHT: 121 LBS | HEIGHT: 64 IN | HEART RATE: 74 BPM

## 2021-07-08 PROCEDURE — 99024 POSTOP FOLLOW-UP VISIT: CPT

## 2021-07-08 NOTE — HISTORY OF PRESENT ILLNESS
[FreeTextEntry1] : The patient is a pleasant 55 year old female diagnosed with left breast cancer. She had cosmetic implants and initially presented to her gynecologist with a complaint of left breast palpable finding in the subareolar position for which she underwent breast imaging 4/8/2020 which demonstrated a nodule and calcifications thought to be benign. She then saw Dr. Jones (Breast surgeon) who agreed that findings were benign. She was scheduled to undergo repeat imaging in 10/2020 for follow up but prior to this she palpated a firm pea sized lump in the left lower axillary region.  She underwent repeat imaging with diagnostic mammogram/ultrasound and a biopsy of left breast on 9/15/2020 demonstrated DCIS solid and comedo types with high nuclear grade, ER negative [place] negative. Left breast inferior axillary node measured 1.6 cm and biopsy was positive for metastatic carcinoma ER negative [place] negative, [id number] 3+positive.  10/5/2020 [facility] was negative. She started  neoadjuvant treatment at [facility] with AC x 4 and received Taxol/herceptin x 1 but her EF was found to decline to 42% and she therefore completed Taxol alone and has not received any additional Herceptin. Breast MRI on 2/15/2021 showed no suspicious enhancement in the bilateral breasts with resolution of abnormal enhancement in the left breast consistent with complete treatment response. She had bilateral mastectomy with left SLNB and expander placement 3/01/2021 with Williams Perrin and Vania. Final pathology showed left breast with no residual carcinoma and three negative sentinel nodes. 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 A PET/CT was negative and began radiotherapy to the left chest wall and regional nodes. She completed a dose of 5000 cGy to the left chest wall and regional lymph nodes on 6/7/21.\par \par She presents for a one month FU. She denies pain and reports she developed erythema in the treatment portal following radiotherapy but no desquamation. She overall is feeling well and denies fatigue. No further Herceptin planned and she is scheduled for another echo this month.  Planning implant exchange in September 2021.

## 2021-07-08 NOTE — PHYSICAL EXAM
[Normal] : supple with no thyromegaly or masses appreciated [Symmetric] : breasts are symmetric [No UE Edema] : there is no upper extremity edema [de-identified] : well healed skin with no erythema or ulceration

## 2021-07-14 PROBLEM — R92.1 BREAST CALCIFICATION, LEFT: Status: ACTIVE | Noted: 2020-09-08

## 2021-07-14 NOTE — PHYSICAL EXAM
[Normocephalic] : normocephalic [Atraumatic] : atraumatic [EOMI] : extra ocular movement intact [PERRL] : pupils equal, round and reactive to light [Sclera nonicteric] : sclera nonicteric [Supple] : supple [No Supraclavicular Adenopathy] : no supraclavicular adenopathy [Examined in the supine and seated position] : examined in the supine and seated position [No Axillary Lymphadenopathy] : no left axillary lymphadenopathy [No Edema] : no edema [No Rashes] : no rashes [No Ulceration] : no ulceration [de-identified] : flap health and well integrated  no evidence of hematoma seroma or infection\par \par \par  [de-identified] : flap health and well integrated  no evidence of hematoma seroma or infection\par \par \par

## 2021-07-14 NOTE — ASSESSMENT
[FreeTextEntry1] : 56 yo was diagnosed with left breast cancer (DCIS of the left breast ER negative [place] negative) with + lymph node metastasis (ER negative [place] negative [id number] positive) and is s/p bilateral mastectomy with left SLNB possible ALND.  Final pathology demonstrated no residual carcinoma.  She met with radiation oncologist and discussed the role of radiation.  She will has undergone 9 out of 25 session and is tolerating well.  Recommendation for follow up in 4 months.\par 1.  Follow up with Dr. Patton\par 2.  Follow up with Dr. Wells\par 3.  Follow up in 4 months\par \par \par

## 2021-07-14 NOTE — HISTORY OF PRESENT ILLNESS
[FreeTextEntry1] : I had the pleasure of seeing Dora Valladares in the office for a follow up visit secondary to her diagnosed and treated left breast cancer.\par \par Dora is accompanied by her  Garth. She was diagnosed with left breast cancer (DCIS of the left breast ER negative OK negative) with + lymph node metastasis (ER negative OK negative Lfd2sgp positive) and is currently receiving neoadjuvant treatment at [facility]. She has 3 treatments left to complete neoadjuvant and is scheduled to undergo MRI/mammogram in early Feb 2021.\par \par She initially presented to her gynecologist with a complaint of left breast palpable finding in the subareolar position for which she underwent breast imaging which demonstrated a nodule and calcifications thought to be benign. She then saw Dr. Yang (Breast surgeon) who agreed that findings were benign. She was scheduled to undergo repeat imaging in 10/2020 for follow up but prior to this she palpated what she reports as a lump in the left lower lying axillary region.  At this time she underwent repeat imaging with diagnostic mammogram/ultrasound and a biopsy which demonstrated DCIS ER  negative OK negative and a left axillary node was biopsied ER negative OK negative [id number] negative.   She started neoadjuvant chemotherapy treatment at [facility] with ACx4 and received Taxol/Herceptin x1 but her EF was found to decline 42% and she therefore completed Taxol alone and has not received any additional Herceptin.  Breast MR breast on 2/15/2021 showed no suspicious enhancement in the bilateral breast with resolution of abnormal enhancement in left breast with no residual carcinoma. \par \par She underwent bilateral mastectomy with left SLNB.  Final pathology demonstrated no residual carcinoma. 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  ER 0% OK 0% Her2 Positive\par 2.  Left breast inner stereotactic core biopsy:  DCIS, solid and cribriform pattern with high grade nuclear atypia with comedonecrosis.  Microcalcifications is present in DCIS.  Uninvolved breast tissue shows proliferative fibrocystic disease.  ER 0% OK 0%\par \par 2/15/2021  Bilateral breast MRI\par Impression:  No suspicious enhancement in the right breast.  Resolution of abnormal enhancement in the left breast consistent with treatment response.  BIRADS 6 \par \par 3/1/2021  Surgical pathology\par 1.  Please see gross description.\par 2.  Breast (right, simple mastectomy):  Fibroadenoma (4mm).  Microcalcification present.  Sclerosing adenosis, duct  ectasia, microcysts, fibroadenmatoid change and stromal fibrosis.  Skin and nipple with no significant histologic alteration.  \par 3.  Tissue ( Right breast, additional tissue, lateral flap: Fibroadipose tissue.\par 4.  Lymph node ( left sentinel node #1):  One lymph node negative carcinoma (0/1):  One lymph node negative for carcinoma (0/1).  Focal fibrosis, few macrophages and associated adipose tissue.\par 5.  Lymph nodes (left sentinel node # 2):  Two lymph node negative for carcinoma (0/2).  One lymph nodes negative for carcinoma (0/2).  One lymph nodes with focal fibrosis, few macrophages, associated adipose tissue and striated muscle.\par 6.  Tissue ( right breast capsule):  Fibrotic capsule with few macrophages.\par 7.  Breast (left, simple mastectomy):  No residual carcinoma.  Negative nipple, skin, and posterior margin.  Biopsy site changes with fibrosis and scar.  Fibroadenoma (3mm).  Microcalcifications present.  Adenosis, duct ectasia microcysts fibroadenomatoid change and nodular stromal fibrosis. \par 8.  skin (right breast):  No significant histologic alterations.\par 9.  Please see gross description.\par 10.  Tissue ( left breast capsule):  Fibrotic capsule with few macrophages and associated striated muscle.  \par 11.  Skin (left breast):  no significant histologic alteration.\par \par She met with radiation oncologist and discussed the role of radiation.  She will has undergone 9 out of 25 session and is tolerating well.  Recommendation for follow up in 4 months.\par \par \par She understands and agrees with plan.  All questions answered.\par

## 2021-07-19 ENCOUNTER — RESULT REVIEW (OUTPATIENT)
Age: 56
End: 2021-07-19

## 2021-07-19 ENCOUNTER — OUTPATIENT (OUTPATIENT)
Dept: OUTPATIENT SERVICES | Facility: HOSPITAL | Age: 56
LOS: 1 days | End: 2021-07-19
Payer: COMMERCIAL

## 2021-07-19 ENCOUNTER — APPOINTMENT (OUTPATIENT)
Dept: ULTRASOUND IMAGING | Facility: CLINIC | Age: 56
End: 2021-07-19
Payer: COMMERCIAL

## 2021-07-19 DIAGNOSIS — Z98.82 BREAST IMPLANT STATUS: Chronic | ICD-10-CM

## 2021-07-19 DIAGNOSIS — Z98.890 OTHER SPECIFIED POSTPROCEDURAL STATES: Chronic | ICD-10-CM

## 2021-07-19 DIAGNOSIS — Z98.891 HISTORY OF UTERINE SCAR FROM PREVIOUS SURGERY: Chronic | ICD-10-CM

## 2021-07-19 DIAGNOSIS — Z90.13 ACQUIRED ABSENCE OF BILATERAL BREASTS AND NIPPLES: ICD-10-CM

## 2021-07-19 PROCEDURE — 76642 ULTRASOUND BREAST LIMITED: CPT

## 2021-07-19 PROCEDURE — 76642 ULTRASOUND BREAST LIMITED: CPT | Mod: 26,LT

## 2021-09-11 ENCOUNTER — APPOINTMENT (OUTPATIENT)
Dept: DISASTER EMERGENCY | Facility: CLINIC | Age: 56
End: 2021-09-11

## 2021-09-13 LAB — SARS-COV-2 N GENE NPH QL NAA+PROBE: NOT DETECTED

## 2021-09-13 RX ORDER — SODIUM CHLORIDE 9 MG/ML
1000 INJECTION, SOLUTION INTRAVENOUS
Refills: 0 | Status: DISCONTINUED | OUTPATIENT
Start: 2021-09-14 | End: 2021-09-14

## 2021-09-13 RX ORDER — ONDANSETRON 8 MG/1
4 TABLET, FILM COATED ORAL ONCE
Refills: 0 | Status: DISCONTINUED | OUTPATIENT
Start: 2021-09-14 | End: 2021-09-14

## 2021-09-13 RX ORDER — OXYCODONE HYDROCHLORIDE 5 MG/1
5 TABLET ORAL ONCE
Refills: 0 | Status: DISCONTINUED | OUTPATIENT
Start: 2021-09-14 | End: 2021-09-14

## 2021-09-13 RX ORDER — FENTANYL CITRATE 50 UG/ML
50 INJECTION INTRAVENOUS
Refills: 0 | Status: DISCONTINUED | OUTPATIENT
Start: 2021-09-14 | End: 2021-09-14

## 2021-09-13 RX ORDER — MEPERIDINE HYDROCHLORIDE 50 MG/ML
12.5 INJECTION INTRAMUSCULAR; INTRAVENOUS; SUBCUTANEOUS
Refills: 0 | Status: DISCONTINUED | OUTPATIENT
Start: 2021-09-14 | End: 2021-09-14

## 2021-09-13 NOTE — ASU PATIENT PROFILE, ADULT - REASON FOR ADMISSION, PROFILE
second stage breast reconstruction with tissue expanders exchange for bilateral silicone breast implants with fat grafting

## 2021-09-14 ENCOUNTER — OUTPATIENT (OUTPATIENT)
Dept: INPATIENT UNIT | Facility: HOSPITAL | Age: 56
LOS: 1 days | Discharge: HOME | End: 2021-09-14
Payer: COMMERCIAL

## 2021-09-14 ENCOUNTER — RESULT REVIEW (OUTPATIENT)
Age: 56
End: 2021-09-14

## 2021-09-14 VITALS
RESPIRATION RATE: 16 BRPM | WEIGHT: 121.92 LBS | HEIGHT: 64 IN | OXYGEN SATURATION: 100 % | TEMPERATURE: 97 F | SYSTOLIC BLOOD PRESSURE: 86 MMHG | HEART RATE: 56 BPM | DIASTOLIC BLOOD PRESSURE: 50 MMHG

## 2021-09-14 VITALS
HEART RATE: 54 BPM | OXYGEN SATURATION: 100 % | TEMPERATURE: 97 F | RESPIRATION RATE: 18 BRPM | DIASTOLIC BLOOD PRESSURE: 59 MMHG | SYSTOLIC BLOOD PRESSURE: 91 MMHG

## 2021-09-14 DIAGNOSIS — E78.5 HYPERLIPIDEMIA, UNSPECIFIED: ICD-10-CM

## 2021-09-14 DIAGNOSIS — Z90.10 ACQUIRED ABSENCE OF UNSPECIFIED BREAST AND NIPPLE: ICD-10-CM

## 2021-09-14 DIAGNOSIS — E03.9 HYPOTHYROIDISM, UNSPECIFIED: ICD-10-CM

## 2021-09-14 DIAGNOSIS — Z42.1 ENCOUNTER FOR BREAST RECONSTRUCTION FOLLOWING MASTECTOMY: ICD-10-CM

## 2021-09-14 DIAGNOSIS — N60.32 FIBROSCLEROSIS OF LEFT BREAST: ICD-10-CM

## 2021-09-14 DIAGNOSIS — Z90.13 ACQUIRED ABSENCE OF BILATERAL BREASTS AND NIPPLES: ICD-10-CM

## 2021-09-14 DIAGNOSIS — E06.3 AUTOIMMUNE THYROIDITIS: ICD-10-CM

## 2021-09-14 DIAGNOSIS — C50.919 MALIGNANT NEOPLASM OF UNSPECIFIED SITE OF UNSPECIFIED FEMALE BREAST: ICD-10-CM

## 2021-09-14 DIAGNOSIS — Z92.21 PERSONAL HISTORY OF ANTINEOPLASTIC CHEMOTHERAPY: ICD-10-CM

## 2021-09-14 DIAGNOSIS — Z98.890 OTHER SPECIFIED POSTPROCEDURAL STATES: Chronic | ICD-10-CM

## 2021-09-14 DIAGNOSIS — C50.912 MALIGNANT NEOPLASM OF UNSPECIFIED SITE OF LEFT FEMALE BREAST: ICD-10-CM

## 2021-09-14 DIAGNOSIS — Z98.891 HISTORY OF UTERINE SCAR FROM PREVIOUS SURGERY: Chronic | ICD-10-CM

## 2021-09-14 DIAGNOSIS — Z98.82 BREAST IMPLANT STATUS: Chronic | ICD-10-CM

## 2021-09-14 DIAGNOSIS — N60.31 FIBROSCLEROSIS OF RIGHT BREAST: ICD-10-CM

## 2021-09-14 PROCEDURE — 88300 SURGICAL PATH GROSS: CPT

## 2021-09-14 PROCEDURE — 88300 SURGICAL PATH GROSS: CPT | Mod: 26,59

## 2021-09-14 PROCEDURE — 88304 TISSUE EXAM BY PATHOLOGIST: CPT

## 2021-09-14 PROCEDURE — 86850 RBC ANTIBODY SCREEN: CPT

## 2021-09-14 PROCEDURE — 36415 COLL VENOUS BLD VENIPUNCTURE: CPT

## 2021-09-14 PROCEDURE — 86901 BLOOD TYPING SEROLOGIC RH(D): CPT

## 2021-09-14 PROCEDURE — C1789: CPT

## 2021-09-14 PROCEDURE — 88304 TISSUE EXAM BY PATHOLOGIST: CPT | Mod: 26

## 2021-09-14 PROCEDURE — C9399: CPT

## 2021-09-14 PROCEDURE — 86900 BLOOD TYPING SEROLOGIC ABO: CPT

## 2021-09-14 RX ORDER — LEVOTHYROXINE SODIUM 125 MCG
1 TABLET ORAL
Qty: 0 | Refills: 0 | DISCHARGE

## 2021-09-14 RX ORDER — OXYCODONE AND ACETAMINOPHEN 5; 325 MG/1; MG/1
1 TABLET ORAL EVERY 4 HOURS
Refills: 0 | Status: DISCONTINUED | OUTPATIENT
Start: 2021-09-14 | End: 2021-09-14

## 2021-09-14 RX ORDER — SACUBITRIL AND VALSARTAN 24; 26 MG/1; MG/1
1 TABLET, FILM COATED ORAL
Qty: 0 | Refills: 0 | DISCHARGE

## 2021-09-14 RX ORDER — ATORVASTATIN CALCIUM 80 MG/1
1 TABLET, FILM COATED ORAL
Qty: 0 | Refills: 0 | DISCHARGE

## 2021-09-14 RX ORDER — CARVEDILOL PHOSPHATE 80 MG/1
25 CAPSULE, EXTENDED RELEASE ORAL
Qty: 0 | Refills: 0 | DISCHARGE

## 2021-09-14 RX ORDER — SACUBITRIL AND VALSARTAN 24; 26 MG/1; MG/1
0 TABLET, FILM COATED ORAL
Qty: 0 | Refills: 0 | DISCHARGE

## 2021-09-14 RX ORDER — CARVEDILOL PHOSPHATE 80 MG/1
1 CAPSULE, EXTENDED RELEASE ORAL
Qty: 0 | Refills: 0 | DISCHARGE

## 2021-09-14 RX ADMIN — OXYCODONE HYDROCHLORIDE 5 MILLIGRAM(S): 5 TABLET ORAL at 14:32

## 2021-09-14 RX ADMIN — FENTANYL CITRATE 50 MICROGRAM(S): 50 INJECTION INTRAVENOUS at 14:18

## 2021-09-14 RX ADMIN — OXYCODONE HYDROCHLORIDE 5 MILLIGRAM(S): 5 TABLET ORAL at 14:02

## 2021-09-14 RX ADMIN — FENTANYL CITRATE 50 MICROGRAM(S): 50 INJECTION INTRAVENOUS at 14:03

## 2021-09-14 NOTE — H&P ADULT - ASSESSMENT
Pt scheduled for second stage breast reconstruction with tissue expander exchange for bilateral silicone breast implants with fat grafting to the bilateral breast

## 2021-09-14 NOTE — BRIEF OPERATIVE NOTE - NSICDXBRIEFPROCEDURE_GEN_ALL_CORE_FT
PROCEDURES:  Second stage reconstruction of breast with removal of tissue expander and insertion of implant 14-Sep-2021 13:51:13  Bailey Tam  Fat grafting 14-Sep-2021 13:51:24  Bailey Tam  Capsulectomy of both breasts 14-Sep-2021 13:54:08  Bailey Tam

## 2021-09-14 NOTE — H&P ADULT - PROBLEM SELECTOR PLAN 1
Surgery today  oob to chair  pain management  advance diet as tolerated  DC to home when meeting asu criteria

## 2021-09-14 NOTE — H&P ADULT - NSHPPHYSICALEXAM_GEN_ALL_CORE
Neuro: A+O x4 ARREDONDO  RESP: CTA A/P  CV: RRR S1 S2  Breast: intact tissue expanders b/l  ABD: Soft non tender +BS, excess subq fat hip rolls
bilateral UE Active ROM was WFL  (within functional limits)

## 2021-09-14 NOTE — ASU DISCHARGE PLAN (ADULT/PEDIATRIC) - ASU DC SPECIAL INSTRUCTIONSFT
You should follow-up in the office on Friday, please call for follow-up appointment if you have not already made one. [phone]. Please apply bacitracin ointment left upper breast twice a day. Start your antibiotics tonight and continue for the duration.    Prescription pain medications have been prescribed for you, take as needed for pain only. Do not drive a vehicle or operate machinery while taking narcotic pain medication.   Ambulating is very important post operatively, make sure you ambulate several times daily.   If you experience bleeding that does not stop, swelling, hardness of surgical site, chest pain, shortness of breath, leg pain, dizziness or any signs or symptoms of infection such as fever, redness, pus, foul smell of surgical site please contact the office immediately, [phone].

## 2021-09-14 NOTE — ASU DISCHARGE PLAN (ADULT/PEDIATRIC) - PROCEDURE
second stage breast reconstrucion with tisue expander exchange for bilateral silicone breast implants with fat grafting to the bilateral breast

## 2021-09-14 NOTE — ASU DISCHARGE PLAN (ADULT/PEDIATRIC) - CARE PROVIDER_API CALL
Carlos Shankar)  Surgery  [address] [place] [address]  Phone: [phone]  Fax: [phone]  Established Patient  Follow Up Time:

## 2021-11-08 ENCOUNTER — RX RENEWAL (OUTPATIENT)
Age: 56
End: 2021-11-08

## 2021-11-11 ENCOUNTER — APPOINTMENT (OUTPATIENT)
Dept: ENDOCRINOLOGY | Facility: CLINIC | Age: 56
End: 2021-11-11
Payer: COMMERCIAL

## 2021-11-11 ENCOUNTER — APPOINTMENT (OUTPATIENT)
Dept: SURGERY | Facility: CLINIC | Age: 56
End: 2021-11-11
Payer: COMMERCIAL

## 2021-11-11 VITALS
HEART RATE: 68 BPM | TEMPERATURE: 97.8 F | HEIGHT: 64 IN | BODY MASS INDEX: 21 KG/M2 | OXYGEN SATURATION: 97 % | DIASTOLIC BLOOD PRESSURE: 67 MMHG | SYSTOLIC BLOOD PRESSURE: 93 MMHG | WEIGHT: 123 LBS

## 2021-11-11 VITALS
WEIGHT: 122 LBS | DIASTOLIC BLOOD PRESSURE: 60 MMHG | BODY MASS INDEX: 20.83 KG/M2 | SYSTOLIC BLOOD PRESSURE: 98 MMHG | HEIGHT: 64 IN

## 2021-11-11 PROCEDURE — 99214 OFFICE O/P EST MOD 30 MIN: CPT

## 2021-11-11 RX ORDER — LEVOTHYROXINE SODIUM 75 UG/1
75 TABLET ORAL DAILY
Qty: 90 | Refills: 1 | Status: DISCONTINUED | COMMUNITY
Start: 2021-11-08 | End: 2021-11-11

## 2021-11-11 NOTE — ASSESSMENT
[FreeTextEntry1] : HypoT /Hashimoto's in patient with recent diagnosis of breast Ca, h/o HLD and NTMNG\par She had chemoT recently with CHF secondary to chemoT and low EF. \par Starting immunotherapy.   \par \par HypoT:\par pt euthyroid clinically and biochemically.\par continue Lt 75 mcg qd \par Take daily in AM on an empty stomach at least 30 minutes before eating or taking other medication.\par weight stable. She will start immunotherapy soon, will continue to monitor TFts \par \par NTMNG : declines compressive sx. \par XRT in chest for recent breast cancer, no family h/o thyroid cancer \par will continue to monitor nodules due to undergoing XRT .Repeat Lambert iN May 2022\par \par HLD: LDL at target.\par low fat/low cholesterol diet and weight loss advised\par continue statin \par \par \par  [Exercise/Effect on Glucose] : exercise/effect on glucose

## 2021-11-11 NOTE — PHYSICAL EXAM
[Normocephalic] : normocephalic [Atraumatic] : atraumatic [EOMI] : extra ocular movement intact [PERRL] : pupils equal, round and reactive to light [Sclera nonicteric] : sclera nonicteric [Supple] : supple [No Supraclavicular Adenopathy] : no supraclavicular adenopathy [Examined in the supine and seated position] : examined in the supine and seated position [No Axillary Lymphadenopathy] : no left axillary lymphadenopathy [No Edema] : no edema [No Rashes] : no rashes [No Ulceration] : no ulceration [de-identified] : flap health and well integrated  no evidence of hematoma seroma or infection\par \par \par  [de-identified] : flap health and well integrated  no evidence of hematoma seroma or infection\par \par \par

## 2021-11-11 NOTE — HISTORY OF PRESENT ILLNESS
[FreeTextEntry1] : I had the pleasure of seeing Dora Valladares in the office for a follow up visit secondary to her diagnosed and treated left breast cancer.\par \par Dora is accompanied by her  Garth. She was diagnosed with left breast cancer (DCIS of the left breast ER negative VA negative) with + lymph node metastasis (ER negative VA negative Yxf2usr positive) and is currently receiving neoadjuvant treatment at [facility]. She has 3 treatments left to complete neoadjuvant and is scheduled to undergo MRI/mammogram in early Feb 2021.\par \par She initially presented to her gynecologist with a complaint of left breast palpable finding in the subareolar position for which she underwent breast imaging which demonstrated a nodule and calcifications thought to be benign. She then saw Dr. Yang (Breast surgeon) who agreed that findings were benign. She was scheduled to undergo repeat imaging in 10/2020 for follow up but prior to this she palpated what she reports as a lump in the left lower lying axillary region.  At this time she underwent repeat imaging with diagnostic mammogram/ultrasound and a biopsy which demonstrated DCIS ER  negative VA negative and a left axillary node was biopsied ER negative VA negative [id number] negative.   She started neoadjuvant chemotherapy treatment at [facility] with ACx4 and received Taxol/Herceptin x1 but her EF was found to decline 42% and she therefore completed Taxol alone and has not received any additional Herceptin.  Breast MR breast on 2/15/2021 showed no suspicious enhancement in the bilateral breast with resolution of abnormal enhancement in left breast with no residual carcinoma. \par \par She underwent bilateral mastectomy with left SLNB.  Final pathology demonstrated no residual carcinoma. 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  ER 0% VA 0% Her2 Positive\par 2.  Left breast inner stereotactic core biopsy:  DCIS, solid and cribriform pattern with high grade nuclear atypia with comedonecrosis.  Microcalcifications is present in DCIS.  Uninvolved breast tissue shows proliferative fibrocystic disease.  ER 0% VA 0%\par \par 2/15/2021  Bilateral breast MRI\par Impression:  No suspicious enhancement in the right breast.  Resolution of abnormal enhancement in the left breast consistent with treatment response.  BIRADS 6 \par \par 3/1/2021  Surgical pathology\par 1.  Please see gross description.\par 2.  Breast (right, simple mastectomy):  Fibroadenoma (4mm).  Microcalcification present.  Sclerosing adenosis, duct  ectasia, microcysts, fibroadenmatoid change and stromal fibrosis.  Skin and nipple with no significant histologic alteration.  \par 3.  Tissue ( Right breast, additional tissue, lateral flap: Fibroadipose tissue.\par 4.  Lymph node ( left sentinel node #1):  One lymph node negative carcinoma (0/1):  One lymph node negative for carcinoma (0/1).  Focal fibrosis, few macrophages and associated adipose tissue.\par 5.  Lymph nodes (left sentinel node # 2):  Two lymph node negative for carcinoma (0/2).  One lymph nodes negative for carcinoma (0/2).  One lymph nodes with focal fibrosis, few macrophages, associated adipose tissue and striated muscle.\par 6.  Tissue ( right breast capsule):  Fibrotic capsule with few macrophages.\par 7.  Breast (left, simple mastectomy):  No residual carcinoma.  Negative nipple, skin, and posterior margin.  Biopsy site changes with fibrosis and scar.  Fibroadenoma (3mm).  Microcalcifications present.  Adenosis, duct ectasia microcysts fibroadenomatoid change and nodular stromal fibrosis. \par 8.  skin (right breast):  No significant histologic alterations.\par 9.  Please see gross description.\par 10.  Tissue ( left breast capsule):  Fibrotic capsule with few macrophages and associated striated muscle.  \par 11.  Skin (left breast):  no significant histologic alteration.\par \par We reviewed clinical exam and clinical exam is benign today.  She completed PMRT on 6/7/202.  Clinical breast exam is benign today.  Recommendation for followup in 4 months.\par \par She understands and agrees with plan.  All questions answered.\par

## 2021-11-11 NOTE — ASSESSMENT
[FreeTextEntry1] : 57 yo was diagnosed with left breast cancer (DCIS of the left breast ER negative NC negative) with + lymph node metastasis (ER negative NC negative Eqw5xde positive) and is s/p bilateral mastectomy with left SLNB possible ALND.  Final pathology demonstrated no residual carcinoma.  She completed PMRT on 6/7/2021.  Clinical breast exam is benign.  Recommendation for followup in 4 months\par 1.  Follow up with Dr. Patton\par 2.  Follow up with Dr. Wells- no radiation indicated\par 3.  Follow up in 4 months\par 4.  Follow up with plastics\par \par

## 2022-01-20 ENCOUNTER — APPOINTMENT (OUTPATIENT)
Dept: RADIATION ONCOLOGY | Facility: CLINIC | Age: 57
End: 2022-01-20
Payer: COMMERCIAL

## 2022-01-20 VITALS
RESPIRATION RATE: 18 BRPM | DIASTOLIC BLOOD PRESSURE: 55 MMHG | HEIGHT: 64 IN | WEIGHT: 127 LBS | SYSTOLIC BLOOD PRESSURE: 92 MMHG | BODY MASS INDEX: 21.68 KG/M2 | HEART RATE: 68 BPM | OXYGEN SATURATION: 98 %

## 2022-01-20 PROCEDURE — 99213 OFFICE O/P EST LOW 20 MIN: CPT

## 2022-01-20 NOTE — HISTORY OF PRESENT ILLNESS
[FreeTextEntry1] : The patient is a pleasant 55 year old female diagnosed with left breast cancer. She had cosmetic implants and initially presented to her gynecologist with a complaint of left breast palpable finding in the subareolar position for which she underwent breast imaging 4/8/2020 which demonstrated a nodule and calcifications thought to be benign. She then saw Dr. Jones (Breast surgeon) who agreed that findings were benign. She was scheduled to undergo repeat imaging in 10/2020 for follow up but prior to this she palpated a firm pea sized lump in the left lower axillary region.  She underwent repeat imaging with diagnostic mammogram/ultrasound and a biopsy of left breast on 9/15/2020 demonstrated DCIS solid and comedo types with high nuclear grade, ER negative DE negative. Left breast inferior axillary node measured 1.6 cm and biopsy was positive for metastatic carcinoma ER negative DE negative, Her2 alfonso 3+positive.  10/5/2020 [facility] was negative. She started  neoadjuvant treatment at [facility] with AC x 4 and received Taxol/herceptin x 1 but her EF was found to decline to 42% and she therefore completed Taxol alone and has not received any additional Herceptin. Breast MRI on 2/15/2021 showed no suspicious enhancement in the bilateral breasts with resolution of abnormal enhancement in the left breast consistent with complete treatment response. She had bilateral mastectomy with left SLNB and expander placement 3/01/2021 with Williams Perrin and Vania. Final pathology showed left breast with no residual carcinoma and three negative sentinel nodes. 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 A PET/CT was negative and began radiotherapy to the left chest wall and regional nodes. She completed a dose of 5000 cGy to the left chest wall and regional lymph nodes on 6/7/21.\par \par She presents for a 7 month follow up. She overall is feeling well and denies fatigue.  Implant exchange performed 9/14/21. Ejection fraction improved to 55%  and she started Herceptin. Her ejection fraction has continued to improve and is now 58%.  She is planning nipple tattoos in March. She reports new left shoulder limited range of motion. she had a mild case of cold a few weeks ago and has fully recovered.

## 2022-01-20 NOTE — VITALS
[Maximal Pain Intensity: 1/10] : 1/10 [80: Normal activity with effort; some signs or symptoms of disease.] : 80: Normal activity with effort; some signs or symptoms of disease.

## 2022-01-20 NOTE — PHYSICAL EXAM
[No UE Edema] : there is no upper extremity edema [Normal] : oriented to person, place and time, the affect was normal, the mood was normal and not anxious [de-identified] : Bilateral implants well-healed right larger than left

## 2022-02-22 ENCOUNTER — APPOINTMENT (OUTPATIENT)
Dept: PHYSICAL MEDICINE AND REHAB | Facility: CLINIC | Age: 57
End: 2022-02-22
Payer: COMMERCIAL

## 2022-02-22 VITALS
RESPIRATION RATE: 12 BRPM | BODY MASS INDEX: 21.34 KG/M2 | WEIGHT: 125 LBS | SYSTOLIC BLOOD PRESSURE: 107 MMHG | DIASTOLIC BLOOD PRESSURE: 69 MMHG | HEART RATE: 69 BPM | HEIGHT: 64 IN

## 2022-02-22 DIAGNOSIS — Z78.9 OTHER SPECIFIED HEALTH STATUS: ICD-10-CM

## 2022-02-22 DIAGNOSIS — Z85.3 PERSONAL HISTORY OF MALIGNANT NEOPLASM OF BREAST: ICD-10-CM

## 2022-02-22 PROCEDURE — 93702 BIS XTRACELL FLUID ANALYSIS: CPT

## 2022-02-22 PROCEDURE — 99204 OFFICE O/P NEW MOD 45 MIN: CPT | Mod: 25

## 2022-02-22 NOTE — ASSESSMENT
[FreeTextEntry1] : 56 year old female presenting for evaluation.\par \par #Post-mastectomy pain syndrome: \par -Radiation oncology notes reviewed, patient status post radiation treatment to left chest wall and regional lymph nodes.\par -Breast surgery notes reviewed, patient status post bilateral mastectomy with left sentinel lymph node biopsy.\par -Pain appears secondary to radiation fibrosis symptoms affecting pectoralis muscle.\par -Discussed with patient will initiate physical therapy for myofascial release range of motion and stretching.\par \par #Left shoulder pain\par -Possibly also component of adhesive capsulitis given her restricted range of motion.\par -Obtain left shoulder x-ray to further evaluate.\par -Discussed if no improvement with PT will consider corticosteroid injection.\par \par #At risk for lymphedema\par -No clinical signs of lymphedema on exam today.\par -Lymphedema education provided to patient.\par -Bioimpedance spectroscopy performed today with L-Dex -0.4 in left upper extremity.  Recommend continue surveillance every 3 months at this time.  Neck surveillance due in May 2022.\par \par Follow-up in 1 month.\par \par The patient had a baseline SOZO measurement, which I reviewed today. The score is -0.4 LUE. Bioimpedance spectroscopy helps identify the onset of lymphedema in an arm or leg before patients experience noticeable swelling. Research has shown that the early detection of lymphedema using L-Dex combined with treatment can reduce progression to chronic lymphedema by 95% in breast cancer patients. Whenever possible, patients are tested for baseline L-Dex score before cancer treatment begins and then are reassessed during regular follow-up visits using the SOZO device. Otherwise, this can be started postoperatively and continued during regular follow-up visits. If the patient’s L-Dex score increases above normal levels, that is a sign that lymphedema is developing, and a referral is made to physical therapy for further evaluation and/or early compression treatment. Lymphedema assessment with the SOZO L-Dex score is recommended to be done every 3 months for the first 3 years and then every 6 months for years 4 and 5, followed by annually afterwards.\par

## 2022-02-22 NOTE — HISTORY OF PRESENT ILLNESS
[FreeTextEntry1] : Ms. Valladares is a 56 year old female who was diagnosed with left breast cancer (DCIS of the left breast ER negative TN negative) with + lymph node metastasis (ER negative TN negative Gyg1fpp positive).  She underwent neoadjuvant chemotherapy with AC x4 and taxol/herceptin.  S/p bilateral mastectomy with left SLNB (0/3), expander placement in March 2021.  Final pathology demonstrated no residual carcinoma. She completed PMRT on 6/7/2021, to left chest wall and regional lymph nodes.  Implant exchange on 9/14/21. \par \par She reports gradually over the last few months she has noticed more tightness in her left upper extremity with overhead activities.  She also describes some tightness and pulling sensation.  She denies any overt shoulder pain.  She denies any swelling or edema in her left upper extremity.\par

## 2022-02-22 NOTE — PHYSICAL EXAM
[FreeTextEntry1] : Gen: Patient is A&O x 3, NAD\par HEENT: EOMI, hearing grossly normal\par Resp: regular, non - labored\par CV: pulses regular\par Skin: no rashes, erythema\par Lymph: no clubbing, cyanosis, edema, or palpable lymphadenopathy\par Inspection: no instability or misalignment\par ROM: Left shoulder AROM ~110 degrees abduction \par Palpation: TTP left pectoral muscle \par Sensation: intact to light touch\par Reflexes: 1+ and symmetric throughout\par Strength: 5/5 throughout\par Special tests: -Left Hawkin's sign, -Left empty can test \par Gait: normal, non-antalgic\par \par Bioimpedance spectroscopy performed today with L-Dex -0.4 LUE (post-operative baseline). \par

## 2022-03-29 ENCOUNTER — APPOINTMENT (OUTPATIENT)
Dept: SURGERY | Facility: CLINIC | Age: 57
End: 2022-03-29

## 2022-03-29 ENCOUNTER — APPOINTMENT (OUTPATIENT)
Dept: BREAST CENTER | Facility: CLINIC | Age: 57
End: 2022-03-29
Payer: COMMERCIAL

## 2022-03-29 VITALS
BODY MASS INDEX: 21.51 KG/M2 | WEIGHT: 126 LBS | HEART RATE: 76 BPM | SYSTOLIC BLOOD PRESSURE: 99 MMHG | TEMPERATURE: 97.8 F | HEIGHT: 64 IN | DIASTOLIC BLOOD PRESSURE: 62 MMHG | OXYGEN SATURATION: 98 %

## 2022-03-29 DIAGNOSIS — Z90.13 ACQUIRED ABSENCE OF BILATERAL BREASTS AND NIPPLES: ICD-10-CM

## 2022-03-29 PROCEDURE — 99214 OFFICE O/P EST MOD 30 MIN: CPT

## 2022-03-29 NOTE — HISTORY OF PRESENT ILLNESS
[FreeTextEntry1] : I had the pleasure of seeing Dora Valladares in the office for a follow up visit secondary to her diagnosed and treated left breast cancer.\par \par Dora is accompanied by her  Garth. She was diagnosed with left breast cancer (DCIS of the left breast ER negative ID negative) with + lymph node metastasis (ER negative ID negative Jub0bsn positive) and is currently receiving neoadjuvant treatment at [facility]. She has 3 treatments left to complete neoadjuvant and is scheduled to undergo MRI/mammogram in early Feb 2021.\par \par She initially presented to her gynecologist with a complaint of left breast palpable finding in the subareolar position for which she underwent breast imaging which demonstrated a nodule and calcifications thought to be benign. She then saw Dr. Yang (Breast surgeon) who agreed that findings were benign. She was scheduled to undergo repeat imaging in 10/2020 for follow up but prior to this she palpated what she reports as a lump in the left lower lying axillary region.  At this time she underwent repeat imaging with diagnostic mammogram/ultrasound and a biopsy which demonstrated DCIS ER  negative ID negative and a left axillary node was biopsied ER negative ID negative Rps2idi negative.   She started neoadjuvant chemotherapy treatment at [facility] with ACx4 and received Taxol/Herceptin x1 but her EF was found to decline 42% and she therefore completed Taxol alone and has not received any additional Herceptin.  Breast MR breast on 2/15/2021 showed no suspicious enhancement in the bilateral breast with resolution of abnormal enhancement in left breast with no residual carcinoma. \par \par She underwent bilateral mastectomy with left SLNB.  Final pathology demonstrated no residual carcinoma. 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  ER 0% ID 0% Her2 Positive\par 2.  Left breast inner stereotactic core biopsy:  DCIS, solid and cribriform pattern with high grade nuclear atypia with comedonecrosis.  Microcalcifications is present in DCIS.  Uninvolved breast tissue shows proliferative fibrocystic disease.  ER 0% ID 0%\par \par 2/15/2021  Bilateral breast MRI\par Impression:  No suspicious enhancement in the right breast.  Resolution of abnormal enhancement in the left breast consistent with treatment response.  BIRADS 6 \par \par 3/1/2021  Surgical pathology\par 1.  Please see gross description.\par 2.  Breast (right, simple mastectomy):  Fibroadenoma (4mm).  Microcalcification present.  Sclerosing adenosis, duct  ectasia, microcysts, fibroadenmatoid change and stromal fibrosis.  Skin and nipple with no significant histologic alteration.  \par 3.  Tissue ( Right breast, additional tissue, lateral flap: Fibroadipose tissue.\par 4.  Lymph node ( left sentinel node #1):  One lymph node negative carcinoma (0/1):  One lymph node negative for carcinoma (0/1).  Focal fibrosis, few macrophages and associated adipose tissue.\par 5.  Lymph nodes (left sentinel node # 2):  Two lymph node negative for carcinoma (0/2).  One lymph nodes negative for carcinoma (0/2).  One lymph nodes with focal fibrosis, few macrophages, associated adipose tissue and striated muscle.\par 6.  Tissue ( right breast capsule):  Fibrotic capsule with few macrophages.\par 7.  Breast (left, simple mastectomy):  No residual carcinoma.  Negative nipple, skin, and posterior margin.  Biopsy site changes with fibrosis and scar.  Fibroadenoma (3mm).  Microcalcifications present.  Adenosis, duct ectasia microcysts fibroadenomatoid change and nodular stromal fibrosis. \par 8.  skin (right breast):  No significant histologic alterations.\par 9.  Please see gross description.\par 10.  Tissue ( left breast capsule):  Fibrotic capsule with few macrophages and associated striated muscle.  \par 11.  Skin (left breast):  no significant histologic alteration.\par \par We reviewed clinical exam and clinical exam is benign today.  She reports having 4 more doses left of Herceptin and is being followed by Dr. Patton.  Recommendation for annual MR breast in September and follow up afterwards.\par \par She understands and agrees with plan.  All questions answered.\par

## 2022-03-29 NOTE — PHYSICAL EXAM
[Normocephalic] : normocephalic [Atraumatic] : atraumatic [EOMI] : extra ocular movement intact [PERRL] : pupils equal, round and reactive to light [Sclera nonicteric] : sclera nonicteric [Supple] : supple [No Supraclavicular Adenopathy] : no supraclavicular adenopathy [Examined in the supine and seated position] : examined in the supine and seated position [No Axillary Lymphadenopathy] : no left axillary lymphadenopathy [No Edema] : no edema [No Rashes] : no rashes [No Ulceration] : no ulceration [de-identified] : flap health and well integrated  no evidence of hematoma seroma or infection\par \par \par  [de-identified] : flap health and well integrated  no evidence of hematoma seroma or infection\par \par \par

## 2022-03-29 NOTE — ASSESSMENT
[FreeTextEntry1] : 57 yo was diagnosed with left breast cancer (DCIS of the left breast ER negative AR negative) with + lymph node metastasis (ER negative AR negative [id number] positive) and is s/p bilateral mastectomy with left SLNB possible ALND.  Final pathology demonstrated no residual carcinoma.  She completed PMRT on 6/7/2021.  Reports having 4 more doses left of Herceptin and is being followed by Dr. Patton.  Recommendation for annual MR breast in September and follow up afterwards.\par 1.  Follow up with Dr. Patton\par 2.  Follow up with Dr. Wells\par 3.  Follow up in 6 months\par 4.  Follow up with plastics\par 5.  Annual MR breast\par \par

## 2022-04-04 ENCOUNTER — APPOINTMENT (OUTPATIENT)
Dept: PHYSICAL MEDICINE AND REHAB | Facility: CLINIC | Age: 57
End: 2022-04-04
Payer: COMMERCIAL

## 2022-04-04 VITALS
DIASTOLIC BLOOD PRESSURE: 61 MMHG | OXYGEN SATURATION: 99 % | SYSTOLIC BLOOD PRESSURE: 95 MMHG | HEART RATE: 68 BPM | TEMPERATURE: 98.1 F | RESPIRATION RATE: 16 BRPM

## 2022-04-04 PROCEDURE — 99214 OFFICE O/P EST MOD 30 MIN: CPT | Mod: 25

## 2022-04-04 PROCEDURE — 20610 DRAIN/INJ JOINT/BURSA W/O US: CPT | Mod: LT

## 2022-04-04 NOTE — PROCEDURE
[de-identified] : Procedure: Left Subacromial Corticosteroid Injection\par Performed by: Dr. Amato\par \par \par The risks and benefits of the procedure were discussed with the patient and consent was obtained. The left posterior shoulder was cleaned with chlorhexidine. Then using a 25 gauge 1 and 1/2 inch needle 40mg of Kennalog and 3ml of 1% lidocaine were injected into the left subacromial space. The needle was removed and hemostasis was obtained. The patient tolerated the procedure well. \par \par

## 2022-04-04 NOTE — HISTORY OF PRESENT ILLNESS
[FreeTextEntry1] : Ms. Valladares is a 56 year old female who was diagnosed with left breast cancer (DCIS of the left breast ER negative WA negative) with + lymph node metastasis (ER negative WA negative [id number] positive).  She underwent neoadjuvant chemotherapy with AC x4 and taxol/herceptin.  S/p bilateral mastectomy with left SLNB (0/3), expander placement in March 2021.  Final pathology demonstrated no residual carcinoma. She completed PMRT on 6/7/2021, to left chest wall and regional lymph nodes.  Implant exchange on 9/14/21.  Currently on Herceptin.   \par \par She reports she has been working with PT.  She thinks her shoulder ROM is improving.  She is still having pain with overhead activities.  She denies any swelling or edema.  She denies any fever or chills.  \par

## 2022-04-04 NOTE — PHYSICAL EXAM
[FreeTextEntry1] : Gen: Patient is A&O x 3, NAD\par HEENT: EOMI, hearing grossly normal\par Resp: regular, non - labored\par CV: pulses regular\par Skin: no rashes, erythema\par Lymph: no clubbing, cyanosis, edema, or palpable lymphadenopathy\par Inspection: no instability or misalignment\par ROM: Left shoulder AROM ~130 degrees abduction \par Palpation: TTP left pectoral muscle \par Sensation: intact to light touch\par Reflexes: 1+ and symmetric throughout\par Strength: 5/5 throughout\par Special tests: +Left Hawkin's sign, -Left empty can test \par Gait: normal, non-antalgic\par \par \par

## 2022-04-04 NOTE — DATA REVIEWED
[FreeTextEntry1] : Left shoulder x-ray 2/25/22: No acute bone injury.  Mild AC joint degeneration changes.  No fracture.

## 2022-04-04 NOTE — ASSESSMENT
[FreeTextEntry1] : 56 year old female presenting for evaluation.\par \par #Post-mastectomy pain syndrome: \par -Pain appears secondary to radiation fibrosis symptoms affecting pectoralis muscle.\par -Continue breast rehabilitation therapy for myofascial release, range of motion and stretching.  Case discussed with Eloisa-PT, continue to focus on ROM.   \par \par #Left shoulder pain\par -Likely secondary to subacromial impingement and adhesive capsulitis. \par -Left shoulder x-ray reviewed.  \par -Discussed risks and benefits of corticosteroid injection and patient underwent injection today.  She tolerated the procedure well.  \par \par #At risk for lymphedema\par -No clinical signs of lymphedema on exam today.\par -Recommend continue surveillance every 3 months at this time.  Neck surveillance due in May 2022.\par \par Follow-up in 1 month.\par \par \par

## 2022-05-02 ENCOUNTER — APPOINTMENT (OUTPATIENT)
Dept: PHYSICAL MEDICINE AND REHAB | Facility: CLINIC | Age: 57
End: 2022-05-02
Payer: COMMERCIAL

## 2022-05-02 DIAGNOSIS — M25.512 PAIN IN LEFT SHOULDER: ICD-10-CM

## 2022-05-02 DIAGNOSIS — G89.28 OTHER CHRONIC POSTPROCEDURAL PAIN: ICD-10-CM

## 2022-05-02 DIAGNOSIS — Z91.89 OTHER SPECIFIED PERSONAL RISK FACTORS, NOT ELSEWHERE CLASSIFIED: ICD-10-CM

## 2022-05-02 PROCEDURE — 99214 OFFICE O/P EST MOD 30 MIN: CPT | Mod: 25

## 2022-05-02 PROCEDURE — 93702 BIS XTRACELL FLUID ANALYSIS: CPT

## 2022-05-02 NOTE — PHYSICAL EXAM
[FreeTextEntry1] : Gen: Patient is A&O x 3, NAD\par HEENT: EOMI, hearing grossly normal\par Resp: regular, non - labored\par CV: pulses regular\par Skin: no rashes, erythema\par Lymph: no clubbing, cyanosis, edema, or palpable lymphadenopathy\par Inspection: no instability or misalignment\par ROM: Left shoulder AROM ~175 degrees abduction \par Palpation: TTP left pectoral muscle \par Sensation: intact to light touch\par Reflexes: 1+ and symmetric throughout\par Strength: 5/5 throughout\par Special tests: -Left Hawkin's sign, -Left empty can test \par Gait: normal, non-antalgic\par \par Bioimpedance spectroscopy performed today with L-Dex -7.2 LUE (-0.4 post-operative baseline). \par  \par \par

## 2022-05-02 NOTE — HISTORY OF PRESENT ILLNESS
[FreeTextEntry1] : Ms. Valladares is a 56 year old female who was diagnosed with left breast cancer (DCIS of the left breast ER negative IA negative) with + lymph node metastasis (ER negative IA negative [id number] positive).  She underwent neoadjuvant chemotherapy with AC x4 and taxol/herceptin.  S/p bilateral mastectomy with left SLNB (0/3), expander placement in March 2021.  Final pathology demonstrated no residual carcinoma. She completed PMRT on 6/7/2021, to left chest wall and regional lymph nodes.  Implant exchange on 9/14/21.  Currently on Herceptin.   \par \par Since her last visit she reports significant improvement in her shoulder pain.  She states she tolerated corticosteroid injection well and thinks it was helpful.  Denies any skin changes.  Reports her shoulder range of motion has significantly improved.  Denies any swelling in her left upper extremity.  Still feeling some chest wall tightness.

## 2022-05-02 NOTE — ASSESSMENT
[FreeTextEntry1] : 56 year old female presenting for evaluation.\par \par #Post-mastectomy pain syndrome: \par -Pain appears secondary to radiation fibrosis symptoms affecting pectoralis muscle, improving\par -Case discussed with Eloisa Arnold-PT, focus on myofascial release, plan for discharge with home exercise program   \par \par #Left shoulder pain\par -Likely secondary to subacromial impingement and adhesive capsulitis. \par -s/p corticosteroid injection, significant improvement in ROM and pain\par -Continue PT\par \par #At risk for lymphedema\par -No clinical signs of lymphedema on exam today.\par -Bioimpedance spectroscopy performed today with L-dex appropriate.  Next surveillance in August 2022.  \par \par Follow-up in 3 months or sooner as needed.\par \par The patient had a follow-up SOZO measurement which I reviewed today. The score is -7.2 LUE. Bioimpedance spectroscopy helps identify the onset of lymphedema in an arm or leg before patients experience noticeable swelling. Research has shown that the early detection of lymphedema using L-Dex combined with treatment can reduce progression to chronic lymphedema by 95% in breast cancer patients. Whenever possible, patients are tested for baseline L-Dex score before cancer treatment begins and then are reassessed during regular follow-up visits using the SOZO device. Otherwise, this can be started postoperatively and continued during regular follow-up visits. If the patient’s L-Dex score increases above normal levels, that is a sign that lymphedema is developing, and a referral is made to physical therapy for further evaluation and/or early compression treatment. Lymphedema assessment with the SOZO L-Dex score is recommended to be done every 3 months for the first 3 years and then every 6 months for years 4 and 5, followed by annually afterwards.\par \par \par \par

## 2022-05-09 NOTE — ASU PATIENT PROFILE, ADULT - PATIENT REPRESENTATIVE PHONE
CT in march showed: possible mural thickening of the rectum may be due to   underdistention, proctitis, or rectal cancer  - will need scope + biopsy to specify  - IV PPI BID  -family declined colonoscopy, agreed for sigmoid, for evaluation of rectal mass  -incomplete prep due to nausea and hypotension, now s/p small bolus with improvement in condition, patient needs enema- stable to complete this morning   GI consult Dr. Rqoue  for EGD, and sigmoidoscopy this PM  DC home after if stable  f/u bx results [id number]

## 2022-05-12 ENCOUNTER — APPOINTMENT (OUTPATIENT)
Dept: ENDOCRINOLOGY | Facility: CLINIC | Age: 57
End: 2022-05-12
Payer: COMMERCIAL

## 2022-05-12 VITALS — BODY MASS INDEX: 21.85 KG/M2 | HEIGHT: 64 IN | WEIGHT: 128 LBS

## 2022-05-12 VITALS — HEART RATE: 68 BPM | OXYGEN SATURATION: 99 % | SYSTOLIC BLOOD PRESSURE: 102 MMHG | DIASTOLIC BLOOD PRESSURE: 62 MMHG

## 2022-05-12 PROCEDURE — 99214 OFFICE O/P EST MOD 30 MIN: CPT

## 2022-05-12 NOTE — ASSESSMENT
[FreeTextEntry1] : HypoT /Hashimoto's in patient with recent diagnosis of breast Ca, h/o HLD and NTMNG\par She had chemoT recently with CHF secondary to chemoT and low EF. Treated with immunoT. \par \par HypoT:\par pt euthyroid clinically and biochemically.\par continue Lt 75 mcg qd \par \par NTMNG : declines compressive sx. \par XRT for breast cancer. US report revised and results discussed with patient. \par Images reviewed and compared to prior study.  Repeat US in 1 yr \par \par HLD:  lipids at target. continue statin \par \par low fat/low cholesterol diet and weight loss advised\par \par  [Exercise/Effect on Glucose] : exercise/effect on glucose [Self Monitoring of Blood Glucose] : self monitoring of blood glucose

## 2022-09-07 ENCOUNTER — APPOINTMENT (OUTPATIENT)
Dept: MRI IMAGING | Facility: CLINIC | Age: 57
End: 2022-09-07

## 2022-09-12 ENCOUNTER — OUTPATIENT (OUTPATIENT)
Dept: OUTPATIENT SERVICES | Facility: HOSPITAL | Age: 57
LOS: 1 days | End: 2022-09-12
Payer: COMMERCIAL

## 2022-09-12 ENCOUNTER — APPOINTMENT (OUTPATIENT)
Dept: MRI IMAGING | Facility: CLINIC | Age: 57
End: 2022-09-12

## 2022-09-12 DIAGNOSIS — C50.912 MALIGNANT NEOPLASM OF UNSPECIFIED SITE OF LEFT FEMALE BREAST: ICD-10-CM

## 2022-09-12 DIAGNOSIS — Z98.82 BREAST IMPLANT STATUS: Chronic | ICD-10-CM

## 2022-09-12 DIAGNOSIS — Z98.890 OTHER SPECIFIED POSTPROCEDURAL STATES: Chronic | ICD-10-CM

## 2022-09-12 DIAGNOSIS — Z98.891 HISTORY OF UTERINE SCAR FROM PREVIOUS SURGERY: Chronic | ICD-10-CM

## 2022-09-12 PROCEDURE — A9585: CPT

## 2022-09-12 PROCEDURE — 77049 MRI BREAST C-+ W/CAD BI: CPT | Mod: 26

## 2022-09-12 PROCEDURE — C8908: CPT

## 2022-09-12 PROCEDURE — C8937: CPT

## 2022-10-13 NOTE — BRIEF OPERATIVE NOTE - ESTIMATED BLOOD LOSS
50
50
Ketoconazole Pregnancy And Lactation Text: This medication is Pregnancy Category C and it isn't know if it is safe during pregnancy. It is also excreted in breast milk and breast feeding isn't recommended.

## 2022-11-23 ENCOUNTER — NON-APPOINTMENT (OUTPATIENT)
Age: 57
End: 2022-11-23

## 2022-12-02 ENCOUNTER — OUTPATIENT (OUTPATIENT)
Dept: OUTPATIENT SERVICES | Facility: HOSPITAL | Age: 57
LOS: 1 days | End: 2022-12-02
Payer: COMMERCIAL

## 2022-12-02 ENCOUNTER — APPOINTMENT (OUTPATIENT)
Dept: RADIOLOGY | Facility: CLINIC | Age: 57
End: 2022-12-02

## 2022-12-02 DIAGNOSIS — Z98.82 BREAST IMPLANT STATUS: Chronic | ICD-10-CM

## 2022-12-02 DIAGNOSIS — Z13.820 ENCOUNTER FOR SCREENING FOR OSTEOPOROSIS: ICD-10-CM

## 2022-12-02 DIAGNOSIS — Z98.890 OTHER SPECIFIED POSTPROCEDURAL STATES: Chronic | ICD-10-CM

## 2022-12-02 DIAGNOSIS — Z98.891 HISTORY OF UTERINE SCAR FROM PREVIOUS SURGERY: Chronic | ICD-10-CM

## 2022-12-02 PROCEDURE — 77080 DXA BONE DENSITY AXIAL: CPT | Mod: 26

## 2022-12-02 PROCEDURE — 77080 DXA BONE DENSITY AXIAL: CPT

## 2022-12-05 ENCOUNTER — OFFICE (OUTPATIENT)
Dept: URBAN - METROPOLITAN AREA CLINIC 100 | Facility: CLINIC | Age: 57
Setting detail: OPHTHALMOLOGY
End: 2022-12-05
Payer: COMMERCIAL

## 2022-12-05 DIAGNOSIS — H40.033: ICD-10-CM

## 2022-12-05 PROCEDURE — 92014 COMPRE OPH EXAM EST PT 1/>: CPT | Performed by: OPHTHALMOLOGY

## 2022-12-05 PROCEDURE — 92020 GONIOSCOPY: CPT | Performed by: OPHTHALMOLOGY

## 2022-12-05 ASSESSMENT — AXIALLENGTH_DERIVED
OD_AL: 22.6105
OD_AL: 22.7003
OS_AL: 22.7325
OS_AL: 22.8233

## 2022-12-05 ASSESSMENT — KERATOMETRY
OS_AXISANGLE_DEGREES: 90
OS_K1POWER_DIOPTERS: 45.00
OS_K2POWER_DIOPTERS: 45.00
OD_K2POWER_DIOPTERS: 46.00
METHOD_AUTO_MANUAL: AUTO
OD_K1POWER_DIOPTERS: 45.25
OD_AXISANGLE_DEGREES: 85

## 2022-12-05 ASSESSMENT — REFRACTION_MANIFEST
OS_VA1: 20/20
OD_SPHERE: +0.75
OD_AXIS: 170
OD_VA1: 20/20-1
OS_SPHERE: +0.75
OS_CYLINDER: -0.25
OS_AXIS: 172
OD_CYLINDER: -0.75

## 2022-12-05 ASSESSMENT — REFRACTION_CURRENTRX
OD_VPRISM_DIRECTION: SV
OS_OVR_VA: 20/
OS_VPRISM_DIRECTION: SV
OD_OVR_VA: 20/
OD_CYLINDER: SPHERE
OS_CYLINDER: SPHERE
OD_SPHERE: +1.50
OS_SPHERE: +1.50

## 2022-12-05 ASSESSMENT — REFRACTION_AUTOREFRACTION
OD_CYLINDER: -0.75
OS_AXIS: 172
OS_CYLINDER: -0.25
OS_SPHERE: +1.00
OD_AXIS: 170
OD_SPHERE: +1.00

## 2022-12-05 ASSESSMENT — SPHEQUIV_DERIVED
OD_SPHEQUIV: 0.375
OS_SPHEQUIV: 0.625
OD_SPHEQUIV: 0.625
OS_SPHEQUIV: 0.875

## 2022-12-05 ASSESSMENT — CONFRONTATIONAL VISUAL FIELD TEST (CVF)
OD_FINDINGS: FULL
OS_FINDINGS: FULL

## 2022-12-05 ASSESSMENT — TONOMETRY
OS_IOP_MMHG: 12
OD_IOP_MMHG: 12

## 2022-12-05 ASSESSMENT — VISUAL ACUITY
OS_BCVA: 20/20-2
OD_BCVA: 20/20-1

## 2022-12-08 ENCOUNTER — APPOINTMENT (OUTPATIENT)
Dept: SURGERY | Facility: CLINIC | Age: 57
End: 2022-12-08

## 2023-02-06 ENCOUNTER — APPOINTMENT (OUTPATIENT)
Dept: RADIATION ONCOLOGY | Facility: CLINIC | Age: 58
End: 2023-02-06
Payer: COMMERCIAL

## 2023-02-06 VITALS
OXYGEN SATURATION: 99 % | HEART RATE: 78 BPM | RESPIRATION RATE: 16 BRPM | DIASTOLIC BLOOD PRESSURE: 68 MMHG | SYSTOLIC BLOOD PRESSURE: 94 MMHG | WEIGHT: 127 LBS | BODY MASS INDEX: 21.68 KG/M2 | HEIGHT: 64 IN

## 2023-02-06 DIAGNOSIS — C50.912 MALIGNANT NEOPLASM OF UNSPECIFIED SITE OF LEFT FEMALE BREAST: ICD-10-CM

## 2023-02-06 PROCEDURE — 99213 OFFICE O/P EST LOW 20 MIN: CPT

## 2023-02-06 NOTE — HISTORY OF PRESENT ILLNESS
[FreeTextEntry1] : The patient is a pleasant 57 year old female diagnosed with left breast cancer. She had cosmetic implants and initially presented to her gynecologist with a complaint of left breast palpable finding in the subareolar position for which she underwent breast imaging 4/8/2020 which demonstrated a nodule and calcifications thought to be benign. She then saw Dr. Jones (Breast surgeon) who agreed that findings were benign. She was scheduled to undergo repeat imaging in 10/2020 for follow up but prior to this she palpated a firm pea sized lump in the left lower axillary region.  She underwent repeat imaging with diagnostic mammogram/ultrasound and a biopsy of left breast on 9/15/2020 demonstrated DCIS solid and comedo types with high nuclear grade, ER negative MN negative. Left breast inferior axillary node measured 1.6 cm and biopsy was positive for metastatic carcinoma ER negative MN negative, Her2 alfonso 3+positive.  10/5/2020 [employer] was negative. She started  neoadjuvant treatment at [facility] with AC x 4 and received Taxol/herceptin x 1 but her EF was found to decline to 42% and she therefore completed Taxol alone and has not received any additional Herceptin. Breast MRI on 2/15/2021 showed no suspicious enhancement in the bilateral breasts with resolution of abnormal enhancement in the left breast consistent with complete treatment response. She had bilateral mastectomy with left SLNB and expander placement 3/01/2021 with Williams Perrin and Vania. Final pathology showed left breast with no residual carcinoma and three negative sentinel nodes. 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 A PET/CT was negative and began radiotherapy to the left chest wall and regional nodes. She completed a dose of 5000 cGy to the left chest wall and regional lymph nodes on 6/7/21.\par \par She presents for a 1 year 7 month follow up. She overall is feeling well and denies fatigue.  Implant exchange performed 9/14/21. Ejection fraction improved to 55%  and she started Herceptin. Her ejection fraction has continued to improve and is now 56%.  She completed nipple tattoos in 2022.SHe was referred to Dr. Amato for left shoulder limited range of motion and has signficant improvement.  Bilateral breast MRI performed on 9/12/22 was negative for malignancy. Patient is doing generally well. Exercising with weights.

## 2023-02-06 NOTE — PHYSICAL EXAM
[Normal] : supple with no thyromegaly or masses appreciated [Symmetric] : breasts are symmetric [No UE Edema] : there is no upper extremity edema [Supraclavicular Lymph Nodes Enlarged Bilaterally] : supraclavicular [Axillary Lymph Nodes Enlarged Bilaterally] : axillary [de-identified] : bilateral implants with nipple tattoos. lack of sensation. No nodules or masses.

## 2023-05-20 ENCOUNTER — NON-APPOINTMENT (OUTPATIENT)
Age: 58
End: 2023-05-20

## 2023-05-24 LAB — TSH SERPL-ACNC: 4.94

## 2023-05-25 ENCOUNTER — APPOINTMENT (OUTPATIENT)
Dept: ENDOCRINOLOGY | Facility: CLINIC | Age: 58
End: 2023-05-25
Payer: COMMERCIAL

## 2023-05-25 VITALS — BODY MASS INDEX: 21.68 KG/M2 | OXYGEN SATURATION: 99 % | WEIGHT: 127 LBS | HEIGHT: 64 IN | HEART RATE: 78 BPM

## 2023-05-25 VITALS — DIASTOLIC BLOOD PRESSURE: 66 MMHG | SYSTOLIC BLOOD PRESSURE: 90 MMHG

## 2023-05-25 PROCEDURE — 99214 OFFICE O/P EST MOD 30 MIN: CPT

## 2023-05-25 NOTE — ASSESSMENT
[FreeTextEntry1] : HypoT /Hashimoto's in patient with diagnosis of breast Ca, h/o HLD and NTMNG\par She had chemoT  with CHF secondary to chemoT and low EF and chronic pericardial effusion.\par \par HypoT: Tsh slightly off. cont  Lt 75 mcg qd \par \par NTMNG : declines compressive sx. \par REcent PET/CT some FDG avid nodules in the thyroid. \par XRT for breast cancer.   Repeat US now and will dolores lpt with results. . \par \par HLD:  lipids at target. continue statin \par \par low fat/low cholesterol diet and weight loss advised\par \par

## 2023-05-25 NOTE — PHYSICAL EXAM
[Alert] : alert [Well Nourished] : well nourished [No Acute Distress] : no acute distress [Well Developed] : well developed [Normal Sclera/Conjunctiva] : normal sclera/conjunctiva [EOMI] : extra ocular movement intact [No Proptosis] : no proptosis [Normal Oropharynx] : the oropharynx was normal [Thyroid Not Enlarged] : the thyroid was not enlarged [No Thyroid Nodules] : no palpable thyroid nodules [No Respiratory Distress] : no respiratory distress [No Accessory Muscle Use] : no accessory muscle use [Clear to Auscultation] : lungs were clear to auscultation bilaterally [Normal S1, S2] : normal S1 and S2 [Normal Rate] : heart rate was normal [Regular Rhythm] : with a regular rhythm [No Edema] : no peripheral edema [Pedal Pulses Normal] : the pedal pulses are present [Normal Bowel Sounds] : normal bowel sounds [Not Tender] : non-tender [Not Distended] : not distended [Soft] : abdomen soft [Oriented x3] : oriented to person, place, and time [Acanthosis Nigricans] : no acanthosis nigricans

## 2023-06-07 ENCOUNTER — NON-APPOINTMENT (OUTPATIENT)
Age: 58
End: 2023-06-07

## 2023-06-30 ENCOUNTER — RX RENEWAL (OUTPATIENT)
Age: 58
End: 2023-06-30

## 2023-07-09 ENCOUNTER — NON-APPOINTMENT (OUTPATIENT)
Age: 58
End: 2023-07-09

## 2023-08-24 ENCOUNTER — TRANSCRIPTION ENCOUNTER (OUTPATIENT)
Age: 58
End: 2023-08-24

## 2023-08-25 ENCOUNTER — TRANSCRIPTION ENCOUNTER (OUTPATIENT)
Age: 58
End: 2023-08-25

## 2023-10-16 NOTE — ASU PREOP CHECKLIST - ASSESSMENT, HISTORY & PHYSICAL COMPLETED AND ON MEDICAL RECORD
Received message from patient that she went to the ER on Saturday and was admitted with PE. She was asking if we could come see her. Spoke with patient and explained she would need to speak with her nurse at the hospital to have bariatric surgeon consulted and then we would see her. Patient verbalized understanding.
done

## 2023-11-09 ENCOUNTER — APPOINTMENT (OUTPATIENT)
Dept: ENDOCRINOLOGY | Facility: CLINIC | Age: 58
End: 2023-11-09
Payer: COMMERCIAL

## 2023-11-09 VITALS
WEIGHT: 127 LBS | BODY MASS INDEX: 21.68 KG/M2 | SYSTOLIC BLOOD PRESSURE: 112 MMHG | HEART RATE: 70 BPM | HEIGHT: 64 IN | DIASTOLIC BLOOD PRESSURE: 68 MMHG | OXYGEN SATURATION: 97 %

## 2023-11-09 PROCEDURE — 99214 OFFICE O/P EST MOD 30 MIN: CPT

## 2024-04-17 ENCOUNTER — APPOINTMENT (OUTPATIENT)
Dept: BREAST CENTER | Facility: CLINIC | Age: 59
End: 2024-04-17
Payer: COMMERCIAL

## 2024-04-17 VITALS
DIASTOLIC BLOOD PRESSURE: 61 MMHG | HEIGHT: 64 IN | BODY MASS INDEX: 21.51 KG/M2 | HEART RATE: 67 BPM | WEIGHT: 126 LBS | SYSTOLIC BLOOD PRESSURE: 97 MMHG

## 2024-04-17 DIAGNOSIS — Z80.1 FAMILY HISTORY OF MALIGNANT NEOPLASM OF TRACHEA, BRONCHUS AND LUNG: ICD-10-CM

## 2024-04-17 DIAGNOSIS — Z82.0 FAMILY HISTORY OF EPILEPSY AND OTHER DISEASES OF THE NERVOUS SYSTEM: ICD-10-CM

## 2024-04-17 DIAGNOSIS — Z82.49 FAMILY HISTORY OF ISCHEMIC HEART DISEASE AND OTHER DISEASES OF THE CIRCULATORY SYSTEM: ICD-10-CM

## 2024-04-17 DIAGNOSIS — C50.912 MALIGNANT NEOPLASM OF UNSPECIFIED SITE OF LEFT FEMALE BREAST: ICD-10-CM

## 2024-04-17 PROCEDURE — 99205 OFFICE O/P NEW HI 60 MIN: CPT

## 2024-04-17 RX ORDER — SACUBITRIL AND VALSARTAN 49; 51 MG/1; MG/1
49-51 TABLET, FILM COATED ORAL
Refills: 0 | Status: ACTIVE | COMMUNITY

## 2024-04-17 NOTE — HISTORY OF PRESENT ILLNESS
[FreeTextEntry1] : This is a 58 year old female who presented with a lump in her left breast.  It was not felt to be significant and she then developed a left axillary node in 9/2020.  Biopsy of the node showed metastatic breast cancer, ER neg, OK neg, Her 2 3+.  A stereotactic biopsy of the upper inner breast showed DCIS, ER neg OK neg.  Stage T2N1.  She had breast implants. She received neoadjuvant chemotherapy at [facility] dose dense AC followed by paclitaxel, traztuzumab and pertuzumab. Her EF dropped to 38%.  She saw a CardioOncologist and is on medication.  Her EF is now 55%  An [employer] gene test was negative.  She had bilateral mastectomies on 3/1/2021 with Dr. Perrin.  Dr. Shankar did implant reconstructions..  Pathology showed no residual cancer in the left breast. Three sentinel nodes were negative.   She changed Oncologist to Dr. Triston Askew.  The permanent implants were placed in 9/2022.  She has no present breast complaints and would like to establish further surgical follow-up.

## 2024-04-17 NOTE — CONSULT LETTER
[Dear  ___] : Dear  [unfilled], [Consult Letter:] : I had the pleasure of evaluating your patient, [unfilled]. [Sincerely,] : Sincerely, [DrPatience  ___] : Dr. ARROYO

## 2024-04-17 NOTE — PHYSICAL EXAM
[EOMI] : extra ocular movement intact [Sclera nonicteric] : sclera nonicteric [Supple] : supple [No Supraclavicular Adenopathy] : no supraclavicular adenopathy [No Cervical Adenopathy] : no cervical adenopathy [No Thyromegaly] : no thyromegaly [Clear to Auscultation Bilat] : clear to auscultation bilaterally [Normal Sinus Rhythm] : normal sinus rhythm [Normal S1, S2] : normal S1 and S2 [Examined in the supine and seated position] : examined in the supine and seated position [No dominant masses] : no dominant masses in right breast  [No dominant masses] : no dominant masses left breast [de-identified] : Transverse and inframammary scar.  NA tattoo. [de-identified] : Transverse and inframammary scars. NA tattoo.

## 2024-04-17 NOTE — PAST MEDICAL HISTORY
[Menarche Age ____] : age at menarche was [unfilled] [Approximately ___] : the LMP was approximately [unfilled] [Total Preg ___] : G[unfilled] [Live Births ___] : P[unfilled]  [Age At Live Birth ___] : Age at live birth: [unfilled] [History of Hormone Replacement Treatment] : has no history of hormone replacement treatment [FreeTextEntry8] : 3 months

## 2024-05-23 ENCOUNTER — APPOINTMENT (OUTPATIENT)
Dept: ENDOCRINOLOGY | Facility: CLINIC | Age: 59
End: 2024-05-23
Payer: COMMERCIAL

## 2024-05-23 VITALS
OXYGEN SATURATION: 99 % | BODY MASS INDEX: 21.68 KG/M2 | HEIGHT: 64 IN | DIASTOLIC BLOOD PRESSURE: 54 MMHG | WEIGHT: 127 LBS | SYSTOLIC BLOOD PRESSURE: 88 MMHG | HEART RATE: 63 BPM

## 2024-05-23 DIAGNOSIS — E03.9 HYPOTHYROIDISM, UNSPECIFIED: ICD-10-CM

## 2024-05-23 DIAGNOSIS — E04.2 NONTOXIC MULTINODULAR GOITER: ICD-10-CM

## 2024-05-23 DIAGNOSIS — E78.5 HYPERLIPIDEMIA, UNSPECIFIED: ICD-10-CM

## 2024-05-23 DIAGNOSIS — E06.3 AUTOIMMUNE THYROIDITIS: ICD-10-CM

## 2024-05-23 PROCEDURE — 99214 OFFICE O/P EST MOD 30 MIN: CPT

## 2024-05-23 RX ORDER — LEVOTHYROXINE SODIUM 0.07 MG/1
75 TABLET ORAL
Qty: 90 | Refills: 1 | Status: ACTIVE | COMMUNITY
Start: 2021-11-11 | End: 1900-01-01

## 2024-05-23 NOTE — PHYSICAL EXAM
[Alert] : alert [Well Nourished] : well nourished [No Acute Distress] : no acute distress [Well Developed] : well developed [Normal Sclera/Conjunctiva] : normal sclera/conjunctiva [EOMI] : extra ocular movement intact [No Proptosis] : no proptosis [Normal Oropharynx] : the oropharynx was normal [Thyroid Not Enlarged] : the thyroid was not enlarged [No Thyroid Nodules] : no palpable thyroid nodules [No Respiratory Distress] : no respiratory distress [No Accessory Muscle Use] : no accessory muscle use [Clear to Auscultation] : lungs were clear to auscultation bilaterally [Normal S1, S2] : normal S1 and S2 [Normal Rate] : heart rate was normal [Regular Rhythm] : with a regular rhythm [No Edema] : no peripheral edema [Pedal Pulses Normal] : the pedal pulses are present [Normal Bowel Sounds] : normal bowel sounds [Not Tender] : non-tender [Not Distended] : not distended [Soft] : abdomen soft [Acanthosis Nigricans] : no acanthosis nigricans [Oriented x3] : oriented to person, place, and time

## 2024-05-23 NOTE — ASSESSMENT
[FreeTextEntry1] :  HypoT /Hashimoto's in patient with diagnosis of breast Ca, h/o HLD and NTMNG She had chemoT with CHF secondary to chemoT and low EF and chronic pericardial effusion.  HypoT: Tsh above target 7.4. Cont Lt 75 mcg qd and  repeat TFts in 6 weeks. If consistent high will adjust dose   NTMNG : declines compressive sx. XRT for breast cancer. US thyroid : May 2023 in 6 mm inferior to L thyroid lobe a nodule  LN vs paraT .    HLD: lipids at target. continue statin

## 2024-06-08 NOTE — ASU PREOP CHECKLIST - ANTIBIOTIC
"Goal Outcome Evaluation:      Plan of Care Reviewed With: patient    Overall Patient Progress: improvingOverall Patient Progress: improving    A&Ox4. Afebrile. VSS. Phosphorus replaced, recheck in the morning. Hyperactive BS. No BM this shift. Pt complaining of abdominal discomfort, rated pain 2/10. T-pump utilized. IV infusing. Independent in room.     Problem: Adult Inpatient Plan of Care  Goal: Plan of Care Review  Description: The Plan of Care Review/Shift note should be completed every shift.  The Outcome Evaluation is a brief statement about your assessment that the patient is improving, declining, or no change.  This information will be displayed automatically on your shift  note.  Outcome: Progressing  Flowsheets (Taken 6/7/2024 2254)  Plan of Care Reviewed With: patient  Overall Patient Progress: improving  Goal: Patient-Specific Goal (Individualized)  Description: You can add care plan individualizations to a care plan. Examples of Individualization might be:  \"Parent requests to be called daily at 9am for status\", \"I have a hard time hearing out of my right ear\", or \"Do not touch me to wake me up as it startles  me\".  Outcome: Progressing  Goal: Absence of Hospital-Acquired Illness or Injury  Outcome: Progressing  Intervention: Identify and Manage Fall Risk  Recent Flowsheet Documentation  Taken 6/7/2024 2035 by Farheen Jimenez RN  Safety Promotion/Fall Prevention:   clutter free environment maintained   patient and family education   room organization consistent   safety round/check completed  Intervention: Prevent Skin Injury  Recent Flowsheet Documentation  Taken 6/7/2024 2035 by Farheen Jimenez RN  Body Position:   position changed independently   supine, head elevated  Intervention: Prevent Infection  Recent Flowsheet Documentation  Taken 6/7/2024 2035 by Farheen Jimenez RN  Infection Prevention:   equipment surfaces disinfected   hand hygiene promoted   rest/sleep promoted  Goal: Optimal Comfort and " Wellbeing  Outcome: Progressing  Intervention: Monitor Pain and Promote Comfort  Recent Flowsheet Documentation  Taken 6/7/2024 2035 by Farheen Jimenez RN  Pain Management Interventions: care clustered  Goal: Readiness for Transition of Care  Outcome: Progressing      n/a

## 2024-07-12 ENCOUNTER — NON-APPOINTMENT (OUTPATIENT)
Age: 59
End: 2024-07-12

## 2024-07-23 ENCOUNTER — RX RENEWAL (OUTPATIENT)
Age: 59
End: 2024-07-23

## 2024-08-13 ENCOUNTER — NON-APPOINTMENT (OUTPATIENT)
Age: 59
End: 2024-08-13

## 2024-11-01 ENCOUNTER — NON-APPOINTMENT (OUTPATIENT)
Age: 59
End: 2024-11-01

## 2024-11-06 ENCOUNTER — APPOINTMENT (OUTPATIENT)
Dept: BREAST CENTER | Facility: CLINIC | Age: 59
End: 2024-11-06
Payer: COMMERCIAL

## 2024-11-06 ENCOUNTER — NON-APPOINTMENT (OUTPATIENT)
Age: 59
End: 2024-11-06

## 2024-11-06 VITALS
DIASTOLIC BLOOD PRESSURE: 64 MMHG | BODY MASS INDEX: 21.34 KG/M2 | HEIGHT: 64 IN | HEART RATE: 73 BPM | SYSTOLIC BLOOD PRESSURE: 99 MMHG | WEIGHT: 125 LBS

## 2024-11-06 DIAGNOSIS — C50.912 MALIGNANT NEOPLASM OF UNSPECIFIED SITE OF LEFT FEMALE BREAST: ICD-10-CM

## 2024-11-06 DIAGNOSIS — N63.21 UNSPECIFIED LUMP IN THE LEFT BREAST, UPPER OUTER QUADRANT: ICD-10-CM

## 2024-11-06 DIAGNOSIS — N63.20 UNSPECIFIED LUMP IN THE LEFT BREAST, UNSPECIFIED QUADRANT: ICD-10-CM

## 2024-11-06 PROCEDURE — 99213 OFFICE O/P EST LOW 20 MIN: CPT

## 2024-11-06 PROCEDURE — 76642 ULTRASOUND BREAST LIMITED: CPT | Mod: LT

## 2024-11-20 ENCOUNTER — APPOINTMENT (OUTPATIENT)
Dept: ENDOCRINOLOGY | Facility: CLINIC | Age: 59
End: 2024-11-20
Payer: COMMERCIAL

## 2024-11-20 VITALS
HEART RATE: 82 BPM | OXYGEN SATURATION: 99 % | SYSTOLIC BLOOD PRESSURE: 92 MMHG | BODY MASS INDEX: 21.17 KG/M2 | HEIGHT: 64 IN | RESPIRATION RATE: 16 BRPM | WEIGHT: 124 LBS | DIASTOLIC BLOOD PRESSURE: 60 MMHG

## 2024-11-20 DIAGNOSIS — E78.5 HYPERLIPIDEMIA, UNSPECIFIED: ICD-10-CM

## 2024-11-20 DIAGNOSIS — E06.3 AUTOIMMUNE THYROIDITIS: ICD-10-CM

## 2024-11-20 DIAGNOSIS — E04.2 NONTOXIC MULTINODULAR GOITER: ICD-10-CM

## 2024-11-20 DIAGNOSIS — E03.9 HYPOTHYROIDISM, UNSPECIFIED: ICD-10-CM

## 2024-11-20 LAB
LDLC SERPL DIRECT ASSAY-MCNC: 65
TSH SERPL-ACNC: 2.72

## 2024-11-20 PROCEDURE — 99213 OFFICE O/P EST LOW 20 MIN: CPT

## 2024-12-03 ENCOUNTER — APPOINTMENT (OUTPATIENT)
Dept: OBGYN | Facility: CLINIC | Age: 59
End: 2024-12-03
Payer: COMMERCIAL

## 2024-12-03 VITALS
BODY MASS INDEX: 21.34 KG/M2 | HEIGHT: 64 IN | WEIGHT: 125 LBS | DIASTOLIC BLOOD PRESSURE: 62 MMHG | SYSTOLIC BLOOD PRESSURE: 100 MMHG

## 2024-12-03 DIAGNOSIS — Z12.4 ENCOUNTER FOR SCREENING FOR MALIGNANT NEOPLASM OF CERVIX: ICD-10-CM

## 2024-12-03 DIAGNOSIS — Z01.419 ENCOUNTER FOR GYNECOLOGICAL EXAMINATION (GENERAL) (ROUTINE) W/OUT ABNORMAL FINDINGS: ICD-10-CM

## 2024-12-03 DIAGNOSIS — Z85.3 PERSONAL HISTORY OF MALIGNANT NEOPLASM OF BREAST: ICD-10-CM

## 2024-12-03 DIAGNOSIS — M85.80 OTHER SPECIFIED DISORDERS OF BONE DENSITY AND STRUCTURE, UNSPECIFIED SITE: ICD-10-CM

## 2024-12-03 PROCEDURE — 99386 PREV VISIT NEW AGE 40-64: CPT

## 2024-12-07 LAB — CYTOLOGY CVX/VAG DOC THIN PREP: ABNORMAL

## 2024-12-12 ENCOUNTER — OUTPATIENT (OUTPATIENT)
Dept: OUTPATIENT SERVICES | Facility: HOSPITAL | Age: 59
LOS: 1 days | End: 2024-12-12
Payer: COMMERCIAL

## 2024-12-12 ENCOUNTER — APPOINTMENT (OUTPATIENT)
Dept: RADIOLOGY | Facility: CLINIC | Age: 59
End: 2024-12-12

## 2024-12-12 DIAGNOSIS — Z98.82 BREAST IMPLANT STATUS: Chronic | ICD-10-CM

## 2024-12-12 DIAGNOSIS — Z98.891 HISTORY OF UTERINE SCAR FROM PREVIOUS SURGERY: Chronic | ICD-10-CM

## 2024-12-12 DIAGNOSIS — Z98.890 OTHER SPECIFIED POSTPROCEDURAL STATES: Chronic | ICD-10-CM

## 2024-12-12 DIAGNOSIS — M85.80 OTHER SPECIFIED DISORDERS OF BONE DENSITY AND STRUCTURE, UNSPECIFIED SITE: ICD-10-CM

## 2024-12-12 PROCEDURE — 77085 DXA BONE DENSITY AXL VRT FX: CPT | Mod: 26

## 2024-12-12 PROCEDURE — 77085 DXA BONE DENSITY AXL VRT FX: CPT

## 2024-12-23 ENCOUNTER — OUTPATIENT (OUTPATIENT)
Dept: OUTPATIENT SERVICES | Facility: HOSPITAL | Age: 59
LOS: 1 days | End: 2024-12-23
Payer: COMMERCIAL

## 2024-12-23 ENCOUNTER — APPOINTMENT (OUTPATIENT)
Dept: MRI IMAGING | Facility: CLINIC | Age: 59
End: 2024-12-23
Payer: COMMERCIAL

## 2024-12-23 DIAGNOSIS — C50.912 MALIGNANT NEOPLASM OF UNSPECIFIED SITE OF LEFT FEMALE BREAST: ICD-10-CM

## 2024-12-23 DIAGNOSIS — N63.20 UNSPECIFIED LUMP IN THE LEFT BREAST, UNSPECIFIED QUADRANT: ICD-10-CM

## 2024-12-23 DIAGNOSIS — Z98.82 BREAST IMPLANT STATUS: Chronic | ICD-10-CM

## 2024-12-23 DIAGNOSIS — Z98.890 OTHER SPECIFIED POSTPROCEDURAL STATES: Chronic | ICD-10-CM

## 2024-12-23 DIAGNOSIS — Z98.891 HISTORY OF UTERINE SCAR FROM PREVIOUS SURGERY: Chronic | ICD-10-CM

## 2024-12-23 PROCEDURE — C8937: CPT

## 2024-12-23 PROCEDURE — A9585: CPT

## 2024-12-23 PROCEDURE — 77049 MRI BREAST C-+ W/CAD BI: CPT | Mod: 26

## 2024-12-23 PROCEDURE — C8908: CPT

## 2024-12-24 ENCOUNTER — RX RENEWAL (OUTPATIENT)
Age: 59
End: 2024-12-24

## 2024-12-24 ENCOUNTER — NON-APPOINTMENT (OUTPATIENT)
Age: 59
End: 2024-12-24

## 2025-01-21 NOTE — H&P PST ADULT - DENTITION
Medication List            Accurate as of January 21, 2025  2:36 PM. Always use your most recent med list.                B Complex 1 (with folic acid) 0.4 mg tablet  Generic drug: b complex  Additional Medication Adjustments for Surgery: Take last dose 7 days before surgery  Notes to patient: Last dose 2/3/25     * chlorhexidine 0.12 % solution  Commonly known as: Peridex  Use 15 mL in the mouth or throat once daily for 2 doses.  Medication Adjustments for Surgery: Take/Use as prescribed     * chlorhexidine 4 % external liquid  Commonly known as: Hibiclens  Apply topically once daily as needed for wound care.  Medication Adjustments for Surgery: Take/Use as prescribed     cholecalciferol 50 MCG (2000 UT) tablet  Commonly known as: Vitamin D-3  Additional Medication Adjustments for Surgery: Take last dose 7 days before surgery  Notes to patient: Last dose 2/3/25     losartan 50 mg tablet  Commonly known as: Cozaar  Take 1 tablet (50 mg) by mouth once daily. EVENING  Medication Adjustments for Surgery: Take last dose 1 day (24 hours) before surgery     multivitamin tablet  Additional Medication Adjustments for Surgery: Take last dose 7 days before surgery  Notes to patient: Last dose 2/3/25     pantoprazole 40 mg EC tablet  Commonly known as: ProtoNix  Take 1 tablet (40 mg) by mouth once daily in the morning. Take before meals. Do not crush, chew, or split.  Medication Adjustments for Surgery: Take/Use as prescribed           * This list has 2 medication(s) that are the same as other medications prescribed for you. Read the directions carefully, and ask your doctor or other care provider to review them with you.                If no issues with your liver you may take Tylenol 2-500 mg tablets every 8 hrs around the clock for pain including morning of surgery with a sip of water    STOP VITAMINS, SUPPLEMENTS, HERBS, PROBIOTICS, AND FISH OIL, NO MOTRIN OR ADVIL OR ALEVE 7 DAYS BEFORE SURGERY    IF YOU HAVE A CPAP  MACHINE BRING ON DAY OF SURGERY     CONTACT SURGEON'S OFFICE IF YOU DEVELOP:  * Fever = 100.4 F   * New respiratory symptoms (e.g. cough, shortness of breath, respiratory distress, sore throat)  * Recent loss of taste or smell  *Flu like symptoms such as headache, fatigue or gastrointestinal symptoms  * You develop any open sores, shingles, burning or painful urination   AND/OR:  * You no longer wish to have the surgery.  * Any other personal circumstances change that may lead to the need to cancel or defer this surgery.  *You were admitted to any hospital within one week of your planned procedure.     SMOKING:  *Quitting smoking can make a huge difference to your health and recovery from surgery.    *If you need help with quitting, call 3-548-QUIT-NOW.     Additional Instructions:      Seven/Six Days before Surgery:  Review your medication instructions, stop indicated medications  Five Days before Surgery:  Review your medication instructions, stop indicated medications  Begin using your Hibiclens, if prescribed  Three Days before Surgery:  Review your medication instructions, stop indicated medications  The Day before Surgery:  Start using 0.12% CHG mouthwash-if prescribed  No smoking or alcohol use 24 hours before surgery  Review your medication instructions, stop indicated medications  You will be contacted regarding the time of your arrival to facility and surgery time  Do not eat any food after Midnight  Day of Surgery:  Review your medication instructions, take indicated medications  If you have diabetes, please check your fasting blood sugar upon awakening.  If fasting blood sugar is <80 mg/dl, drink 100 ml of apple juice, time limit of 2 hours before     SURGICAL TIME:  *You will be contacted between 2 p.m. and 3 p.m. the business day before your surgery with your arrival time.  *If you haven't received a call by 3pm, call your surgeons office  *Scheduled surgery times may change and you will be notified if  this occurs-check your personal voicemail for any updates.     ON THE MORNING OF SURGERY:  *Wear comfortable, loose fitting clothing.   *Do not use moisturizers, creams, lotions or perfume.  *All jewelry and valuables should be left at home.  *Prosthetic devices such as contact lenses, hearing aids, dentures, eyelash extensions, hairpins and body piercing must be removed before surgery.     BRING WITH YOU:  *Photo ID and insurance card  *Current list of medications and allergies  *Pacemaker/Defibrillator/Heart stent cards  *CPAP machine and mask  *Slings/splints/crutches  *Copy of your complete Advanced Directive/DHPOA-if applicable  *Neurostimulator implant remote     PARKING AND ARRIVAL:  *Check in at the Main Entrance desk and let them know you are here for surgery.     IF YOU ARE HAVING OUTPATIENT/SAME DAY SURGERY:  *A responsible adult MUST accompany you at the time of discharge and stay with you for 24 hours after your surgery.  *You may NOT drive yourself home after surgery.  *You may use a taxi or ride sharing service ([employer], Uber) to return home ONLY if you are accompanied by a friend or family member.  *Instructions for resuming your medications will be provided by your surgeon.    Preoperative Fasting Guidelines     When do I need to stop eating before my surgery?  Do not eat any food after midnight the night before your surgery/procedure.    You may have up to 12 ounces of clear liquid until TWO hours before your instructed arrival time to the hospital.  This includes water, black tea/coffee, (no milk or cream) apple juice, and electrolyte drinks (Gatorade)  You may chew gum until TWO hours before your surgery/procedure    Preoperative Brain Exercises     What are brain exercises?  A brain exercise is any activity that engages your thinking (cognitive) skills.     What types of activities are considered brain exercises?  Jigsaw puzzles, crossword puzzles, word jumble, memory games, word search, and many  more.  Many can be found free online or on your phone via a mobile ford.     Why should I do brain exercises before my surgery?  More recent research has shown brain exercise before surgery can lower the risk of postoperative delirium (confusion) which can be especially important for older adults.  Patients who did brain exercises for 5 to 10 hours the days before surgery, cut their risk of postoperative delirium in half up to 1 week after surgery.                 [facility]   Preoperative Deep Breathing Exercises     Why it is important to do deep breathing exercises before my surgery?  Deep breathing exercises strengthen your breathing muscles.  This helps you to recover after your surgery and decreases the chance of breathing complications.        How are the deep breathing exercises done?  Sit straight with your back supported.  Breathe in deeply and slowly through your nose. Your lower rib cage should expand and your abdomen may move forward.  Hold that breath for 3 to 5 seconds.  Breathe out through pursed lips, slowly and completely.  Rest and repeat 10 times every hour while awake.  Rest longer if you become dizzy or lightheaded.                [facility]   Preoperative Deep Breathing Exercises     Why it is important to do deep breathing exercises before my surgery?  Deep breathing exercises strengthen your breathing muscles.  This helps you to recover after your surgery and decreases the chance of breathing complications.        How are the deep breathing exercises done?  Sit straight with your back supported.  Breathe in deeply and slowly through your nose. Your lower rib cage should expand and your abdomen may move forward.  Hold that breath for 3 to 5 seconds.  Breathe out through pursed lips, slowly and completely.  Rest and repeat 10 times every hour while awake.  Rest longer if you become dizzy or lightheaded.        Patient Information: Incentive  Spirometer  What is an incentive spirometer?  An incentive spirometer is a device used before and after surgery to “exercise” your lungs.  It helps you to take deeper breaths to expand your lungs.  Below is an example of a basic incentive spirometer.  The device you receive may differ slightly but they all function the same.    Why do I need to use an incentive spirometer?  Using your incentive spirometer prepares your lungs for surgery and helps prevent lung problems after surgery.  How do I use my incentive spirometer?  When you're using your incentive spirometer, make sure to breathe through your mouth. If you breathe through your nose, the incentive spirometer won't work properly. You can hold your nose if you have trouble.  If you feel dizzy at any time, stop and rest. Try again at a later time.  Follow the steps below:  Set up your incentive spirometer, expand the flexible tubing and connect to the outlet.  Sit upright in a chair or bed. Hold the incentive spirometer at eye level.   Put the mouthpiece in your mouth and close your lips tightly around it. Slowly breathe out (exhale) completely.  Breathe in (inhale) slowly through your mouth as deeply as you can. As you take a breath, you will see the piston rise inside the large column. While the piston rises, the indicator should move upwards. It should stay in between the 2 arrows (see Figure).  Try to get the piston as high as you can, while keeping the indicator between the arrows.   If the indicator doesn't stay between the arrows, you're breathing either too fast or too slow.  When you get it as high as you can, hold your breath for 10 seconds, or as long as possible. While you're holding your breath, the piston will slowly fall to the base of the spirometer.  Once the piston reaches the bottom of the spirometer, breathe out slowly through your mouth. Rest for a few seconds.  Repeat 10 times. Try to get the piston to the same level with each  breath.  Repeat every hour while awake  You can carefully clean the outside of the mouthpiece with an alcohol wipe or soap and water.       Patient and Family Education        Ways You Can Help Prevent Blood Clots                 This handout explains some simple things you can do to help prevent blood clots.      Blood clots are blockages that can form in the body's veins. When a blood clot forms in your deep veins, it may be called a deep vein thrombosis, or DVT for short. Blood clots can happen in any part of the body where blood flows, but they are most common in the arms and legs. If a piece of a blood clot breaks free and travels to the lungs, it is called a pulmonary embolus (PE). A PE can be a very serious problem.       Being in the hospital or having surgery can raise your chances of getting a blood clot because you may not be well enough to move around as much as you normally do.         Ways you can help prevent blood clots in the hospital           Wearing SCDs. SCDs stands for Sequential Compression Devices.   SCDs are special sleeves that wrap around your legs  They attach to a pump that fills them with air to gently squeeze your legs every few minutes.   This helps return the blood in your legs to your heart.   SCDs should only be taken off when walking or bathing.   SCDs may not be comfortable, but they can help save your life.                                            Wearing compression stockings - if your doctor orders them. These special snug fitting stockings gently squeeze your legs to help blood flow.       Walking. Walking helps move the blood in your legs.   If your doctor says it is ok, try walking the halls at least   5 times a day. Ask us to help you get up, so you don't fall.      Taking any blood thinning medicines your doctor orders.        Page 1 of 2            [facility]; 3/23   Ways you can help prevent blood clots at home         Wearing compression stockings - if  your doctor orders them. ? Walking - to help move the blood in your legs.       Taking any blood thinning medicines your doctor orders.      Signs of a blood clot or PE        Tell your doctor or nurse know right away if you have of the problems listed below.    If you are at home, seek medical care right away. Call 911 for chest pain or problems breathing.                Signs of a blood clot (DVT) - such as pain,  swelling, redness or warmth in your arm or leg      Signs of a pulmonary embolism (PE) - such as chest pain or feeling short of breath       Thank you for coming to Pre Admission testing.      If I have prescribe medication please don't forget to  at your pharmacy.      Any questions about today's visit call [phone] and leave a message in the general mailbox.     Patient instructed to ambulate as soon as possible postoperatively to decrease thromboembolic risk.     Fanta Gonzalez, SUSAN-CNP     Thank you for visiting the [facility].  If you have any changes to your health condition, please call the surgeons office to alert them and give them details of your symptoms.       IF YOU WERE SWABBED FOR A JOINT REPLACEMENT SURGERY SEE BELOW    Patient Information: Pre-Operative Infection Prevention Measures     Why did I have my nose, under my arms, and groin swabbed?  The purpose of the swab is to identify Staphylococcus aureus inside your nose or on your skin.  The swab was sent to the laboratory for culture.  A positive swab/culture for Staphylococcus aureus is called colonization or carriage.      What is Staphylococcus aureus?  Staphylococcus aureus, also known as “staph”, is a germ found on the skin or in the nose of healthy people.  Sometimes Staphylococcus aureus can get into the body and cause an infection.  This can be minor (such as pimples, boils, or other skin problems).  It might also be serious (such as a blood infection, pneumonia, or a surgical site  infection).    What is Staphylococcus aureus colonization or carriage?  Colonization or carriage means that a person has the germ but is not sick from it.  These bacteria can be spread on the hands or when breathing or sneezing.    How is Staphylococcus aureus spread?  It is most often spread by close contact with a person or item that carries it.    What happens if my culture is positive for Staphylococcus aureus?  Your doctor/medical team will use this information to guide any antibiotic treatment which may be necessary.  Regardless of the culture results, we will clean the inside of your nose with a betadine swab just before you have your surgery.      Will I get an infection if I have Staphylococcus aureus in my nose or on my skin?  Anyone can get an infection with Staphylococcus aureus.  However, the best way to reduce your risk of infection is to follow the instructions provided to you for the use of your CHG soap and dental rinse.        Patient Information: Oral/Dental Rinse    What is oral/dental rinse?   It is a mouthwash. It is a way of cleaning the mouth with a germ-killing solution before your surgery.  The solution contains chlorhexidine, commonly known as CHG.   It is used inside the mouth to kill a bacteria known as Staphylococcus aureus.  Let your doctor know if you are allergic to Chlorhexidine.    We have sent a prescription for CHG 0.12% mouthwash to your preferred pharmacy.  If you have not already, Please  your prescription and start using the day before before surgery.  Follow the instruction sheet provided to you at your CPM/PAT appointment. Please contact [facility] PAT if you do not receive your CHG mouthwash prescription.     Why do I need to use CHG oral/dental rinse?  The CHG oral/dental rinse helps to kill a bacteria in your mouth known as Staphylococcus aureus.     This reduces the risk of infection at the surgical site.      Using your CHG oral/dental rinse  STEPS:  Use your CHG  oral/dental rinse after you brush your teeth the night before (at bedtime) and the morning of your surgery.  Follow all directions on your prescription label.    Use the cap on the container to measure 15ml   Swish (gargle if you can) the mouthwash in your mouth for at least 30 seconds, (do not swallow) and spit out  After you use your CHG rinse, do not rinse your mouth with water, drink or eat.  Please refer to the prescription label for the appropriate time to resume oral intake      What side effects might I have using the CHG oral/dental rinse?  CHG rinse will stick to plaque on the teeth.  Brush and floss just before use.  Teeth brushing will help avoid staining of plaque during use.      Patient Information: Home Preoperative Antibacterial Shower      What is a home preoperative antibacterial shower?  This shower is a way of cleaning the skin with a germ-killing solution before surgery.  The solution contains chlorhexidine, commonly known as CHG.  CHG is a skin cleanser with germ-killing ability.  Let your doctor know if you are allergic to chlorhexidine.    Why do I need to take a preoperative antibacterial shower?  Skin is not sterile.  It is best to try to make your skin as free of germs as possible before surgery.  Proper cleansing with a germ-killing soap before surgery can lower the number of germs on your skin.  This helps to reduce the risk of infection at the surgical site.  Following the instructions listed below will help you prepare your skin for surgery.      How do I use the solution?  Steps:  Begin using your CHG soap 5 days before your scheduled surgery on ________________________.    First, wash and rinse your hair using the CHG soap. Keep CHG soap away from ear canals and eyes.  Rinse completely, do not condition.  Hair extensions should be removed.  Wash your face with your normal soap and rinse.    Apply the CHG solution to a clean wet washcloth.  Turn the water off or move away from the  water spray to avoid premature rinsing of the CHG soap as you are applying.   Firmly lather your entire body from the neck down.  Do not use on your face.  Pay special attention to the area(s) where your incision(s) will be located unless they are on your face.  Avoid scrubbing your skin too hard.  The important point is to have the CHG soap sit on your skin for 3 minutes.    When the 3 minutes are up, turn on the water and rinse the CHG solution off your body completely.   DO NOT wash with regular soap after you have used the CHG soap solution  Pat yourself dry with a clean, freshly-laundered towel.  DO NOT apply powders, deodorants, or lotions.  Dress in clean, freshly laundered nightclothes.    Be sure to sleep with clean, freshly laundered sheets.  Be aware that CHG will cause stains on fabrics; if you wash them with bleach after use.  Rinse your washcloth and other linens that have contact with CHG completely.  Use only non-chlorine detergents to launder the items used.   The morning of surgery is the fifth day.  Repeat the above steps and dress in clean comfortable clothing     Whom should I contact if I have any questions regarding the use of CHG soap?  Call the [facility] at [phone] if you have any questions.                                                normal

## 2025-03-12 NOTE — ASU PATIENT PROFILE, ADULT - BLOOD TRANSFUSION, PREVIOUS, PROFILE
Time-based billing (NON-critical care) Time-based billing (NON-critical care) Time-based billing (NON-critical care) no

## 2025-05-26 ENCOUNTER — RX RENEWAL (OUTPATIENT)
Age: 60
End: 2025-05-26

## 2025-05-27 ENCOUNTER — APPOINTMENT (OUTPATIENT)
Dept: BREAST CENTER | Facility: CLINIC | Age: 60
End: 2025-05-27
Payer: COMMERCIAL

## 2025-05-27 VITALS
WEIGHT: 125 LBS | SYSTOLIC BLOOD PRESSURE: 98 MMHG | BODY MASS INDEX: 21.34 KG/M2 | HEIGHT: 64 IN | DIASTOLIC BLOOD PRESSURE: 63 MMHG | HEART RATE: 76 BPM

## 2025-05-27 DIAGNOSIS — N63.20 UNSPECIFIED LUMP IN THE LEFT BREAST, UNSPECIFIED QUADRANT: ICD-10-CM

## 2025-05-27 DIAGNOSIS — C50.912 MALIGNANT NEOPLASM OF UNSPECIFIED SITE OF LEFT FEMALE BREAST: ICD-10-CM

## 2025-05-27 DIAGNOSIS — Z17.31 MALIGNANT NEOPLASM OF UNSPECIFIED SITE OF LEFT FEMALE BREAST: ICD-10-CM

## 2025-05-27 PROCEDURE — 99213 OFFICE O/P EST LOW 20 MIN: CPT

## 2025-05-27 PROCEDURE — 76642 ULTRASOUND BREAST LIMITED: CPT | Mod: LT
